# Patient Record
Sex: FEMALE | Race: ASIAN | NOT HISPANIC OR LATINO | Employment: FULL TIME | URBAN - METROPOLITAN AREA
[De-identification: names, ages, dates, MRNs, and addresses within clinical notes are randomized per-mention and may not be internally consistent; named-entity substitution may affect disease eponyms.]

---

## 2017-01-17 DIAGNOSIS — R92.1 MAMMOGRAPHIC CALCIFICATION FOUND ON DIAGNOSTIC IMAGING OF BREAST: ICD-10-CM

## 2017-01-30 ENCOUNTER — TRANSCRIBE ORDERS (OUTPATIENT)
Dept: MAMMOGRAPHY | Facility: CLINIC | Age: 47
End: 2017-01-30

## 2017-01-30 ENCOUNTER — HOSPITAL ENCOUNTER (OUTPATIENT)
Dept: ULTRASOUND IMAGING | Facility: CLINIC | Age: 47
Discharge: HOME | End: 2017-01-30
Payer: COMMERCIAL

## 2017-01-30 DIAGNOSIS — N63.0 LUMP OR MASS IN BREAST: Primary | ICD-10-CM

## 2017-01-30 DIAGNOSIS — R92.1 MAMMOGRAPHIC CALCIFICATION FOUND ON DIAGNOSTIC IMAGING OF BREAST: ICD-10-CM

## 2017-01-30 PROCEDURE — 76642 ULTRASOUND BREAST LIMITED: CPT

## 2017-06-21 DIAGNOSIS — R92.8 OTHER ABNORMAL AND INCONCLUSIVE FINDINGS ON DIAGNOSTIC IMAGING OF BREAST: ICD-10-CM

## 2017-06-21 DIAGNOSIS — R92.1 MAMMOGRAPHIC CALCIFICATION FOUND ON DIAGNOSTIC IMAGING OF BREAST: ICD-10-CM

## 2017-06-21 DIAGNOSIS — Z12.31 ENCOUNTER FOR SCREENING MAMMOGRAM FOR MALIGNANT NEOPLASM OF BREAST: ICD-10-CM

## 2017-06-23 ENCOUNTER — GENERIC CONVERSION - ENCOUNTER (OUTPATIENT)
Dept: OTHER | Facility: OTHER | Age: 47
End: 2017-06-23

## 2017-06-23 ENCOUNTER — ALLSCRIPTS OFFICE VISIT (OUTPATIENT)
Dept: OTHER | Facility: OTHER | Age: 47
End: 2017-06-23

## 2017-06-27 ENCOUNTER — GENERIC CONVERSION - ENCOUNTER (OUTPATIENT)
Dept: OTHER | Facility: OTHER | Age: 47
End: 2017-06-27

## 2017-06-27 LAB
ADEQUACY: (HISTORICAL): NORMAL
CLINICIAN PROVIDIED ICD 9 OR 10 (HISTORICAL): NORMAL
COMMENT (HISTORICAL): NORMAL
DIAGNOSIS (HISTORICAL): NORMAL
NOTE: (HISTORICAL): NORMAL
PERFORMED BY (HISTORICAL): NORMAL
REFLEX (HISTORICAL): NORMAL
TEST INFORMATION (HISTORICAL): NORMAL

## 2017-10-12 DIAGNOSIS — R92.8 OTHER ABNORMAL AND INCONCLUSIVE FINDINGS ON DIAGNOSTIC IMAGING OF BREAST: ICD-10-CM

## 2017-10-12 DIAGNOSIS — R92.1 MAMMOGRAPHIC CALCIFICATION FOUND ON DIAGNOSTIC IMAGING OF BREAST: ICD-10-CM

## 2017-10-12 DIAGNOSIS — R92.2 INCONCLUSIVE MAMMOGRAM: ICD-10-CM

## 2017-11-03 ENCOUNTER — APPOINTMENT (OUTPATIENT)
Dept: MAMMOGRAPHY | Facility: CLINIC | Age: 47
End: 2017-11-03
Payer: COMMERCIAL

## 2017-11-03 ENCOUNTER — HOSPITAL ENCOUNTER (OUTPATIENT)
Dept: MAMMOGRAPHY | Facility: CLINIC | Age: 47
Discharge: HOME | End: 2017-11-03
Payer: COMMERCIAL

## 2017-11-03 ENCOUNTER — HOSPITAL ENCOUNTER (OUTPATIENT)
Dept: ULTRASOUND IMAGING | Facility: CLINIC | Age: 47
Discharge: HOME | End: 2017-11-03
Payer: COMMERCIAL

## 2017-11-03 DIAGNOSIS — N63.0 LUMP OR MASS IN BREAST: ICD-10-CM

## 2017-11-03 PROCEDURE — 76642 ULTRASOUND BREAST LIMITED: CPT

## 2017-11-03 PROCEDURE — G0279 TOMOSYNTHESIS, MAMMO: HCPCS

## 2017-11-03 PROCEDURE — G0204 DX MAMMO INCL CAD BI: HCPCS

## 2018-01-14 VITALS
BODY MASS INDEX: 21.38 KG/M2 | HEIGHT: 64 IN | WEIGHT: 125.25 LBS | DIASTOLIC BLOOD PRESSURE: 74 MMHG | SYSTOLIC BLOOD PRESSURE: 104 MMHG

## 2018-07-03 ENCOUNTER — ANNUAL EXAM (OUTPATIENT)
Dept: GYNECOLOGY | Facility: CLINIC | Age: 48
End: 2018-07-03
Payer: COMMERCIAL

## 2018-07-03 VITALS
SYSTOLIC BLOOD PRESSURE: 124 MMHG | DIASTOLIC BLOOD PRESSURE: 84 MMHG | BODY MASS INDEX: 21.72 KG/M2 | HEIGHT: 64 IN | WEIGHT: 127.2 LBS

## 2018-07-03 DIAGNOSIS — Z01.419 ENCOUNTER FOR GYNECOLOGICAL EXAMINATION WITHOUT ABNORMAL FINDING: ICD-10-CM

## 2018-07-03 DIAGNOSIS — Z01.419 ENCOUNTER FOR GYNECOLOGICAL EXAMINATION WITH PAPANICOLAOU SMEAR OF CERVIX: ICD-10-CM

## 2018-07-03 DIAGNOSIS — L30.9 ECZEMA, UNSPECIFIED TYPE: Primary | ICD-10-CM

## 2018-07-03 PROCEDURE — S0612 ANNUAL GYNECOLOGICAL EXAMINA: HCPCS | Performed by: OBSTETRICS & GYNECOLOGY

## 2018-07-03 PROCEDURE — G0145 SCR C/V CYTO,THINLAYER,RESCR: HCPCS | Performed by: OBSTETRICS & GYNECOLOGY

## 2018-07-03 RX ORDER — LORATADINE 10 MG/1
TABLET ORAL
COMMUNITY

## 2018-07-03 RX ORDER — CLOBETASOL PROPIONATE 0.5 MG/G
CREAM TOPICAL 2 TIMES DAILY
Qty: 30 G | Refills: 0
Start: 2018-07-03 | End: 2018-07-18 | Stop reason: SDUPTHER

## 2018-07-03 NOTE — PROGRESS NOTES
Assessment/Plan:         Diagnoses and all orders for this visit:    Encounter for gynecological examination without abnormal finding    Eczema, unspecified type--clobetasol    Other orders  -     loratadine (CLARITIN) 10 mg tablet; Take by mouth        Subjective:      Patient ID: Margi Aguilar is a 51 y o  female  HPI  Annual exam  Menses regular albeit scant since ablation  C/O slight pruritic rash above Lt breast    The following portions of the patient's history were reviewed and updated as appropriate: allergies, current medications, past family history, past medical history, past social history, past surgical history and problem list     Review of Systems   Constitutional: Negative  Gastrointestinal: Negative  Genitourinary: Negative  Skin: Positive for rash  Objective:      /84 (BP Location: Right arm)   Ht 5' 3 5" (1 613 m)   Wt 57 7 kg (127 lb 3 2 oz)   LMP 06/26/2018   BMI 22 18 kg/m²          Physical Exam   Constitutional: She appears well-developed and well-nourished  Neck: Normal range of motion  Neck supple  No thyromegaly present  Cardiovascular: Normal rate, regular rhythm and normal heart sounds  Pulmonary/Chest: Effort normal and breath sounds normal  Right breast exhibits no inverted nipple, no mass, no nipple discharge, no skin change and no tenderness  Left breast exhibits no inverted nipple, no mass, no nipple discharge and no tenderness  Scaly, reddish rash above Lt breast c/w eczema   Abdominal: Hernia confirmed negative in the right inguinal area and confirmed negative in the left inguinal area  Genitourinary: There is no rash or lesion on the right labia  There is no rash or lesion on the left labia  Uterus is not deviated, not enlarged, not fixed and not tender  Cervix exhibits no motion tenderness, no discharge and no friability  Right adnexum displays no mass, no tenderness and no fullness   Left adnexum displays no mass, no tenderness and no fullness  No erythema or bleeding in the vagina  No vaginal discharge found  Lymphadenopathy:        Right: No inguinal adenopathy present  Left: No inguinal adenopathy present  Skin: Rash noted

## 2018-07-09 LAB
LAB AP GYN PRIMARY INTERPRETATION: NORMAL
LAB AP LMP: NORMAL
Lab: NORMAL

## 2018-07-18 DIAGNOSIS — L30.9 ECZEMA, UNSPECIFIED TYPE: ICD-10-CM

## 2018-07-18 RX ORDER — CLOBETASOL PROPIONATE 0.5 MG/G
CREAM TOPICAL 2 TIMES DAILY
Qty: 30 G | Refills: 0 | Status: SHIPPED | OUTPATIENT
Start: 2018-07-18

## 2018-10-30 DIAGNOSIS — Z12.39 SCREENING FOR BREAST CANCER: Primary | ICD-10-CM

## 2018-11-05 ENCOUNTER — HOSPITAL ENCOUNTER (OUTPATIENT)
Dept: MAMMOGRAPHY | Facility: CLINIC | Age: 48
Discharge: HOME | End: 2018-11-05
Payer: COMMERCIAL

## 2018-11-05 DIAGNOSIS — Z12.39 SCREENING FOR BREAST CANCER: ICD-10-CM

## 2018-11-05 PROCEDURE — 77063 BREAST TOMOSYNTHESIS BI: CPT

## 2018-11-05 PROCEDURE — 77067 SCR MAMMO BI INCL CAD: CPT

## 2019-07-09 ENCOUNTER — ANNUAL EXAM (OUTPATIENT)
Dept: GYNECOLOGY | Facility: CLINIC | Age: 49
End: 2019-07-09
Payer: COMMERCIAL

## 2019-07-09 VITALS
DIASTOLIC BLOOD PRESSURE: 70 MMHG | BODY MASS INDEX: 23.25 KG/M2 | HEART RATE: 76 BPM | HEIGHT: 63 IN | WEIGHT: 131.2 LBS | SYSTOLIC BLOOD PRESSURE: 118 MMHG

## 2019-07-09 DIAGNOSIS — Z01.419 ENCOUNTER FOR GYNECOLOGICAL EXAMINATION WITH PAPANICOLAOU SMEAR OF CERVIX: ICD-10-CM

## 2019-07-09 DIAGNOSIS — Z12.31 ENCOUNTER FOR SCREENING MAMMOGRAM FOR MALIGNANT NEOPLASM OF BREAST: Primary | ICD-10-CM

## 2019-07-09 PROCEDURE — S0612 ANNUAL GYNECOLOGICAL EXAMINA: HCPCS | Performed by: OBSTETRICS & GYNECOLOGY

## 2019-07-09 PROCEDURE — G0145 SCR C/V CYTO,THINLAYER,RESCR: HCPCS | Performed by: OBSTETRICS & GYNECOLOGY

## 2019-07-09 NOTE — PROGRESS NOTES
Assessment/Plan:    No problem-specific Assessment & Plan notes found for this encounter  Diagnoses and all orders for this visit:    Encounter for screening mammogram for malignant neoplasm of breast  -     Mammo screening bilateral w 3d & cad; Future    Encounter for gynecological examination with Papanicolaou smear of cervix  -     Liquid-based pap, screening      Colonoscopy recommended  Referred to Dr Francie child    Subjective:     Patient ID: Roxy Hillman is a 47 y o  female  HPI  Annual exam  Menses regular albeit scant since ablation  No c/o    The following portions of the patient's history were reviewed and updated as appropriate:   She  has no past medical history on file  She There are no active problems to display for this patient  She  has a past surgical history that includes  section; Endometrial biopsy; Hysteroscopy w/ endometrial ablation; Hysteroscopy w/ polypectomy; Breast biopsy; and Tubal ligation  Her family history includes Aortic aneurysm in her father; Hyperlipidemia in her mother; Hypertension in her mother  She  reports that she has never smoked  She has never used smokeless tobacco  She reports that she does not drink alcohol or use drugs  Current Outpatient Medications   Medication Sig Dispense Refill    clobetasol (TEMOVATE) 0 05 % cream Apply topically 2 (two) times a day 30 g 0    loratadine (CLARITIN) 10 mg tablet Take by mouth       No current facility-administered medications for this visit  Current Outpatient Medications on File Prior to Visit   Medication Sig    clobetasol (TEMOVATE) 0 05 % cream Apply topically 2 (two) times a day    loratadine (CLARITIN) 10 mg tablet Take by mouth     No current facility-administered medications on file prior to visit  She is allergic to pollen extract       Review of Systems   Constitutional: Negative  Gastrointestinal: Negative  Genitourinary: Negative            Objective:      /70 (BP Location: Left arm)   Pulse 76   Ht 5' 3" (1 6 m)   Wt 59 5 kg (131 lb 3 2 oz)   LMP 07/03/2019 (Approximate)   BMI 23 24 kg/m²          Physical Exam   Constitutional: She appears well-developed and well-nourished  Neck: Normal range of motion  Neck supple  No thyromegaly present  Cardiovascular: Normal rate, regular rhythm and normal heart sounds  Pulmonary/Chest: Effort normal and breath sounds normal  No respiratory distress  Right breast exhibits no inverted nipple, no mass, no nipple discharge, no skin change and no tenderness  Left breast exhibits no inverted nipple, no mass, no nipple discharge, no skin change and no tenderness  Abdominal: Soft  Bowel sounds are normal  She exhibits no distension and no mass  There is no tenderness  There is no rebound and no guarding  No hernia  Hernia confirmed negative in the right inguinal area and confirmed negative in the left inguinal area  Genitourinary: There is no rash, tenderness or lesion on the right labia  There is no rash, tenderness or lesion on the left labia  Uterus is not deviated, not enlarged, not fixed and not tender  Cervix exhibits no motion tenderness, no discharge and no friability  Right adnexum displays no mass, no tenderness and no fullness  Left adnexum displays no mass, no tenderness and no fullness  No erythema, tenderness or bleeding in the vagina  No vaginal discharge found  Lymphadenopathy:     She has no cervical adenopathy  No inguinal adenopathy noted on the right or left side

## 2019-07-12 LAB
LAB AP GYN PRIMARY INTERPRETATION: NORMAL
Lab: NORMAL

## 2019-12-26 ENCOUNTER — HOSPITAL ENCOUNTER (OUTPATIENT)
Dept: MAMMOGRAPHY | Facility: MEDICAL CENTER | Age: 49
Discharge: HOME | End: 2019-12-26
Payer: COMMERCIAL

## 2019-12-26 VITALS — WEIGHT: 131 LBS | HEIGHT: 63 IN | BODY MASS INDEX: 23.21 KG/M2

## 2019-12-26 DIAGNOSIS — Z12.31 ENCOUNTER FOR SCREENING MAMMOGRAM FOR MALIGNANT NEOPLASM OF BREAST: ICD-10-CM

## 2019-12-26 PROCEDURE — 77067 SCR MAMMO BI INCL CAD: CPT

## 2019-12-26 PROCEDURE — 77063 BREAST TOMOSYNTHESIS BI: CPT

## 2021-04-13 DIAGNOSIS — Z23 ENCOUNTER FOR IMMUNIZATION: ICD-10-CM

## 2021-07-21 ENCOUNTER — HOSPITAL ENCOUNTER (OUTPATIENT)
Dept: MAMMOGRAPHY | Facility: MEDICAL CENTER | Age: 51
Discharge: HOME | End: 2021-07-21
Payer: COMMERCIAL

## 2021-07-21 VITALS — BODY MASS INDEX: 23.21 KG/M2 | WEIGHT: 131 LBS | HEIGHT: 63 IN

## 2021-07-21 DIAGNOSIS — Z12.31 ENCOUNTER FOR SCREENING MAMMOGRAM FOR BREAST CANCER: ICD-10-CM

## 2021-07-21 PROCEDURE — 77067 SCR MAMMO BI INCL CAD: CPT

## 2021-07-21 PROCEDURE — 77063 BREAST TOMOSYNTHESIS BI: CPT

## 2021-08-06 ENCOUNTER — ANNUAL EXAM (OUTPATIENT)
Dept: GYNECOLOGY | Facility: CLINIC | Age: 51
End: 2021-08-06
Payer: COMMERCIAL

## 2021-08-06 VITALS
HEIGHT: 64 IN | BODY MASS INDEX: 21.85 KG/M2 | HEART RATE: 67 BPM | WEIGHT: 128 LBS | DIASTOLIC BLOOD PRESSURE: 60 MMHG | SYSTOLIC BLOOD PRESSURE: 100 MMHG

## 2021-08-06 DIAGNOSIS — Z01.419 ENCOUNTER FOR GYNECOLOGICAL EXAMINATION WITHOUT ABNORMAL FINDING: Primary | ICD-10-CM

## 2021-08-06 DIAGNOSIS — Z01.419 ENCOUNTER FOR GYNECOLOGICAL EXAMINATION WITH PAPANICOLAOU SMEAR OF CERVIX: ICD-10-CM

## 2021-08-06 PROCEDURE — G0145 SCR C/V CYTO,THINLAYER,RESCR: HCPCS | Performed by: OBSTETRICS & GYNECOLOGY

## 2021-08-06 PROCEDURE — S0612 ANNUAL GYNECOLOGICAL EXAMINA: HCPCS | Performed by: OBSTETRICS & GYNECOLOGY

## 2021-08-06 NOTE — PROGRESS NOTES
Assessment/Plan:         Diagnoses and all orders for this visit:    Encounter for gynecological examination without abnormal finding      Colon cancer screening:  Referred to Dr Patito child  Subjective:      Patient ID: Rich Sainz is a 45 y o  female  HPI  patient presents for annual examination  She continues to have regular albeit scant menses since ablation  She denies any vaginal irritation, burning, discharge  Denies any dysuria, hematuria, urgency or stress urinary incontinence  No GI complaints  The following portions of the patient's history were reviewed and updated as appropriate:   She  has no past medical history on file  She There are no problems to display for this patient  She  has a past surgical history that includes  section; Endometrial biopsy; Hysteroscopy w/ endometrial ablation; Hysteroscopy w/ polypectomy; Tubal ligation; and Breast biopsy  Her family history includes Aortic aneurysm in her father; Esophageal cancer (age of onset: 80) in her paternal grandmother; Hyperlipidemia in her mother; Hypertension in her mother; No Known Problems in her maternal aunt, paternal aunt, sister, and sister  She  reports that she has never smoked  She has never used smokeless tobacco  She reports that she does not drink alcohol and does not use drugs  Current Outpatient Medications   Medication Sig Dispense Refill    clobetasol (TEMOVATE) 0 05 % cream Apply topically 2 (two) times a day 30 g 0    loratadine (CLARITIN) 10 mg tablet Take by mouth       No current facility-administered medications for this visit  Current Outpatient Medications on File Prior to Visit   Medication Sig    clobetasol (TEMOVATE) 0 05 % cream Apply topically 2 (two) times a day    loratadine (CLARITIN) 10 mg tablet Take by mouth     No current facility-administered medications on file prior to visit  She is allergic to pollen extract       Review of Systems   Constitutional: Negative  HENT: Negative for sore throat and trouble swallowing  Gastrointestinal: Negative  Genitourinary: Negative  Objective:      /60 (BP Location: Left arm, Patient Position: Sitting, Cuff Size: Standard)   Pulse 67   Ht 5' 3 5" (1 613 m)   Wt 58 1 kg (128 lb)   BMI 22 32 kg/m²          Physical Exam  Vitals reviewed  Constitutional:       Appearance: Normal appearance  She is normal weight  Cardiovascular:      Rate and Rhythm: Normal rate and regular rhythm  Pulses: Normal pulses  Heart sounds: Normal heart sounds  No murmur heard  Pulmonary:      Effort: Pulmonary effort is normal  No respiratory distress  Breath sounds: Normal breath sounds  Chest:      Breasts:         Right: No swelling, bleeding, inverted nipple, mass, nipple discharge, skin change or tenderness  Left: No swelling, bleeding, inverted nipple, mass, nipple discharge, skin change or tenderness  Abdominal:      General: There is no distension  Palpations: Abdomen is soft  There is no mass  Tenderness: There is no abdominal tenderness  There is no guarding or rebound  Hernia: No hernia is present  There is no hernia in the left inguinal area or right inguinal area  Genitourinary:     General: Normal vulva  Labia:         Right: No rash, tenderness or lesion  Left: No rash, tenderness or lesion  Vagina: Normal       Cervix: Normal       Uterus: Normal        Adnexa:         Right: No mass, tenderness or fullness  Left: No mass, tenderness or fullness  Musculoskeletal:      Cervical back: Normal range of motion and neck supple  No tenderness  Lymphadenopathy:      Cervical: No cervical adenopathy  Upper Body:      Right upper body: No supraclavicular, axillary or pectoral adenopathy  Left upper body: No supraclavicular, axillary or pectoral adenopathy  Lower Body: No right inguinal adenopathy  No left inguinal adenopathy  Neurological:      Mental Status: She is alert

## 2021-08-12 LAB
LAB AP GYN PRIMARY INTERPRETATION: NORMAL
Lab: NORMAL

## 2022-03-18 ENCOUNTER — TELEPHONE (OUTPATIENT)
Dept: GASTROENTEROLOGY | Facility: CLINIC | Age: 52
End: 2022-03-18

## 2022-03-18 NOTE — TELEPHONE ENCOUNTER
03/18/22  Screened by: Shameka Ariza    Referring Provider Donna Arreola    Pre- Screening: There is no height or weight on file to calculate BMI  Has patient been referred for a routine screening Colonoscopy? yes  Is the patient between 39-70 years old? yes      Previous Colonoscopy no   If yes:    Date:     Facility:     Reason:       SCHEDULING STAFF: If the patient is between 45yrs-49yrs, please advise patient to confirm benefits/coverage with their insurance company for a routine screening colonoscopy, some insurance carriers will only cover at [facility] or [facility]  If the patient is over 66years old, please schedule an office visit  Does the patient want to see a Gastroenterologist prior to their procedure OR are they having any GI symptoms? no    Has the patient been hospitalized or had abdominal surgery in the past 6 months? no    Does the patient use supplemental oxygen? no    Does the patient take Coumadin, Lovenox, Plavix, Elliquis, Xarelto, or other blood thinning medication? no    Has the patient had a stroke, cardiac event, or stent placed in the past year? no     PT PASSED OA BEST CONTACT NUMBER [phone]    SCHEDULING STAFF: If patient answers NO to above questions, then schedule procedure  If patient answers YES to above questions, then schedule office appointment  If patient is between 45yrs - 49yrs, please advise patient that we will have to confirm benefits & coverage with their insurance company for a routine screening colonoscopy  [Negative] : Endocrine [Recent Change In Weight] : ~T no recent weight change [Dysphagia] : no dysphagia [Chest Pain] : no chest pain [Palpitations] : no palpitations [Shortness Of Breath] : no shortness of breath [Abdominal Pain] : no abdominal pain [Wheezing] : no wheezing [Vomiting] : no vomiting [Constipation] : no constipation [Diarrhea] : no diarrhea [Reflux/Heartburn] : no reflex/heartburn [FreeTextEntry2] : weight stable

## 2022-04-05 NOTE — TELEPHONE ENCOUNTER
Scheduled date of colonoscopy (as of today):06 14 22  Physician performing colonoscopy:DR VIDALES  Location of colonoscopy:[place]  Bowel prep reviewed with patient:DULCOLAX/MIRALAX  Instructions reviewed with patient by:RUTHY VERBALLY/AMILED  Clearances: N/A

## 2022-06-10 RX ORDER — SODIUM CHLORIDE 9 MG/ML
125 INJECTION, SOLUTION INTRAVENOUS CONTINUOUS
Status: CANCELLED | OUTPATIENT
Start: 2022-06-10

## 2022-06-13 ENCOUNTER — ANESTHESIA EVENT (OUTPATIENT)
Dept: ANESTHESIOLOGY | Facility: HOSPITAL | Age: 52
End: 2022-06-13

## 2022-06-13 ENCOUNTER — ANESTHESIA (OUTPATIENT)
Dept: ANESTHESIOLOGY | Facility: HOSPITAL | Age: 52
End: 2022-06-13

## 2022-06-13 NOTE — ANESTHESIA PREPROCEDURE EVALUATION
Procedure:  PRE-OP ONLY    Relevant Problems   ANESTHESIA (within normal limits)      CARDIO (within normal limits)      ENDO (within normal limits)      GI/HEPATIC (within normal limits)      /RENAL (within normal limits)      GYN (within normal limits)      HEMATOLOGY (within normal limits)      MUSCULOSKELETAL (within normal limits)      NEURO/PSYCH (within normal limits)      PULMONARY (within normal limits)             Anesthesia Plan  ASA Score- 1     Anesthesia Type- IV sedation with anesthesia with ASA Monitors  Additional Monitors:   Airway Plan:           Plan Factors-Exercise tolerance (METS): >4 METS  Chart reviewed  Patient summary reviewed  Patient is not a current smoker  Induction- intravenous  Postoperative Plan-     Informed Consent- Anesthetic plan and risks discussed with patient

## 2022-06-14 ENCOUNTER — ANESTHESIA EVENT (OUTPATIENT)
Dept: GASTROENTEROLOGY | Facility: MEDICAL CENTER | Age: 52
End: 2022-06-14

## 2022-06-14 ENCOUNTER — HOSPITAL ENCOUNTER (OUTPATIENT)
Dept: GASTROENTEROLOGY | Facility: MEDICAL CENTER | Age: 52
Setting detail: OUTPATIENT SURGERY
Discharge: HOME | End: 2022-06-14
Attending: INTERNAL MEDICINE | Admitting: INTERNAL MEDICINE
Payer: COMMERCIAL

## 2022-06-14 ENCOUNTER — ANESTHESIA (OUTPATIENT)
Dept: GASTROENTEROLOGY | Facility: MEDICAL CENTER | Age: 52
End: 2022-06-14

## 2022-06-14 VITALS
BODY MASS INDEX: 21.85 KG/M2 | WEIGHT: 128 LBS | RESPIRATION RATE: 14 BRPM | HEART RATE: 82 BPM | TEMPERATURE: 97.7 F | HEIGHT: 64 IN | OXYGEN SATURATION: 100 % | SYSTOLIC BLOOD PRESSURE: 137 MMHG | DIASTOLIC BLOOD PRESSURE: 87 MMHG

## 2022-06-14 DIAGNOSIS — Z12.11 SCREEN FOR COLON CANCER: ICD-10-CM

## 2022-06-14 PROCEDURE — 88305 TISSUE EXAM BY PATHOLOGIST: CPT | Performed by: PATHOLOGY

## 2022-06-14 PROCEDURE — 45385 COLONOSCOPY W/LESION REMOVAL: CPT | Performed by: INTERNAL MEDICINE

## 2022-06-14 PROCEDURE — 45380 COLONOSCOPY AND BIOPSY: CPT | Performed by: INTERNAL MEDICINE

## 2022-06-14 RX ORDER — SODIUM CHLORIDE 9 MG/ML
125 INJECTION, SOLUTION INTRAVENOUS CONTINUOUS
Status: DISCONTINUED | OUTPATIENT
Start: 2022-06-14 | End: 2022-06-18 | Stop reason: HOSPADM

## 2022-06-14 RX ORDER — LIDOCAINE HYDROCHLORIDE 20 MG/ML
INJECTION, SOLUTION EPIDURAL; INFILTRATION; INTRACAUDAL; PERINEURAL AS NEEDED
Status: DISCONTINUED | OUTPATIENT
Start: 2022-06-14 | End: 2022-06-14

## 2022-06-14 RX ORDER — PROPOFOL 10 MG/ML
INJECTION, EMULSION INTRAVENOUS AS NEEDED
Status: DISCONTINUED | OUTPATIENT
Start: 2022-06-14 | End: 2022-06-14

## 2022-06-14 RX ADMIN — Medication 40 MG: at 13:54

## 2022-06-14 RX ADMIN — PROPOFOL 50 MG: 10 INJECTION, EMULSION INTRAVENOUS at 13:51

## 2022-06-14 RX ADMIN — PROPOFOL 50 MG: 10 INJECTION, EMULSION INTRAVENOUS at 13:56

## 2022-06-14 RX ADMIN — SODIUM CHLORIDE 125 ML/HR: 0.9 INJECTION, SOLUTION INTRAVENOUS at 13:06

## 2022-06-14 RX ADMIN — PROPOFOL 50 MG: 10 INJECTION, EMULSION INTRAVENOUS at 13:44

## 2022-06-14 RX ADMIN — LIDOCAINE HYDROCHLORIDE 80 MG: 20 INJECTION, SOLUTION EPIDURAL; INFILTRATION; INTRACAUDAL; PERINEURAL at 13:39

## 2022-06-14 RX ADMIN — PROPOFOL 100 MG: 10 INJECTION, EMULSION INTRAVENOUS at 13:39

## 2022-06-14 NOTE — H&P
History and Physical -  [facility]  Rafiq Alvarenga 47 y o  female MRN: [id number]                  HPI: Rafiq Alvarenga is a 47y o  year old female who presents for colon cancer screening  REVIEW OF SYSTEMS: Per the HPI, and otherwise unremarkable  Historical Information   History reviewed  No pertinent past medical history  Past Surgical History:   Procedure Laterality Date    BREAST BIOPSY      age 35     SECTION      ENDOMETRIAL BIOPSY      HYSTEROSCOPY W/ ENDOMETRIAL ABLATION      HYSTEROSCOPY W/ POLYPECTOMY      TUBAL LIGATION       Social History   Social History     Substance and Sexual Activity   Alcohol Use No     Social History     Substance and Sexual Activity   Drug Use No     Social History     Tobacco Use   Smoking Status Never Smoker   Smokeless Tobacco Never Used     Family History   Problem Relation Age of Onset    Hypertension Mother     Hyperlipidemia Mother     Aortic aneurysm Father     No Known Problems Sister     No Known Problems Sister     Esophageal cancer Paternal Grandmother 80    No Known Problems Maternal Aunt     No Known Problems Paternal Aunt        Meds/Allergies     (Not in a hospital admission)      Allergies   Allergen Reactions    Pollen Extract Allergic Rhinitis       Objective     Blood pressure 139/77, pulse 71, temperature 97 7 °F (36 5 °C), temperature source Temporal, resp  rate 18, height 5' 3 5" (1 613 m), weight 58 1 kg (128 lb), SpO2 96 %  PHYSICAL EXAM    Gen: NAD  CV: RRR  CHEST: Clear  ABD: soft, NT/ND  EXT: no edema      ASSESSMENT/PLAN:  Rafiq Alvarenga is a 47y o  year old female who presents for colon cancer screening  The patient is stable and optimized for the procedure, we reviewed risk and benefits  Risk include but not limited to infection, bleeding, perforation and missing a lesion

## 2022-06-14 NOTE — ANESTHESIA POSTPROCEDURE EVALUATION
Post-Op Assessment Note    CV Status:  Stable    Pain management: adequate     Mental Status:  Alert and awake   Hydration Status:  Euvolemic   PONV Controlled:  Controlled   Airway Patency:  Patent      Post Op Vitals Reviewed: Yes      Staff: Anesthesiologist         No complications documented      BP      Temp      Pulse     Resp      SpO2      /87   Pulse 82   Temp 97 7 °F (36 5 °C) (Temporal)   Resp 14   Ht 5' 3 5" (1 613 m)   Wt 58 1 kg (128 lb)   SpO2 100%   BMI 22 32 kg/m²

## 2022-06-14 NOTE — ANESTHESIA PREPROCEDURE EVALUATION
Procedure:  COLONOSCOPY    Relevant Problems   ANESTHESIA (within normal limits)      CARDIO (within normal limits)      ENDO (within normal limits)      GI/HEPATIC (within normal limits)      /RENAL (within normal limits)      GYN (within normal limits)      HEMATOLOGY (within normal limits)      MUSCULOSKELETAL (within normal limits)      NEURO/PSYCH (within normal limits)      PULMONARY (within normal limits)        Physical Exam    Airway    Mallampati score: I  TM Distance: >3 FB  Neck ROM: full     Dental   No notable dental hx     Cardiovascular  Rhythm: regular, Rate: normal, Cardiovascular exam normal    Pulmonary  Pulmonary exam normal Breath sounds clear to auscultation,     Other Findings        Anesthesia Plan  ASA Score- 1     Anesthesia Type- IV sedation with anesthesia with ASA Monitors  Additional Monitors:   Airway Plan:           Plan Factors-Exercise tolerance (METS): >4 METS  Chart reviewed  Patient summary reviewed  Patient is not a current smoker  Induction- intravenous  Postoperative Plan-     Informed Consent- Anesthetic plan and risks discussed with patient

## 2022-08-10 ENCOUNTER — ANNUAL EXAM (OUTPATIENT)
Dept: GYNECOLOGY | Facility: CLINIC | Age: 52
End: 2022-08-10
Payer: COMMERCIAL

## 2022-08-10 VITALS
HEART RATE: 78 BPM | DIASTOLIC BLOOD PRESSURE: 74 MMHG | HEIGHT: 64 IN | BODY MASS INDEX: 21.48 KG/M2 | SYSTOLIC BLOOD PRESSURE: 124 MMHG | WEIGHT: 125.8 LBS

## 2022-08-10 DIAGNOSIS — Z13.820 SCREENING FOR OSTEOPOROSIS: ICD-10-CM

## 2022-08-10 DIAGNOSIS — Z01.419 ENCOUNTER FOR GYNECOLOGICAL EXAMINATION WITH PAPANICOLAOU SMEAR OF CERVIX: ICD-10-CM

## 2022-08-10 DIAGNOSIS — Z01.419 ENCOUNTER FOR GYNECOLOGICAL EXAMINATION WITHOUT ABNORMAL FINDING: Primary | ICD-10-CM

## 2022-08-10 PROCEDURE — G0145 SCR C/V CYTO,THINLAYER,RESCR: HCPCS | Performed by: OBSTETRICS & GYNECOLOGY

## 2022-08-10 PROCEDURE — 76977 US BONE DENSITY MEASURE: CPT | Performed by: OBSTETRICS & GYNECOLOGY

## 2022-08-10 PROCEDURE — S0612 ANNUAL GYNECOLOGICAL EXAMINA: HCPCS | Performed by: OBSTETRICS & GYNECOLOGY

## 2022-08-10 NOTE — PROGRESS NOTES
Assessment/Plan:       Diagnoses and all orders for this visit:    Encounter for gynecological examination without abnormal finding    Screening for osteoporosis; heel scan: +0 3      Abdominal pain/constipation  Patient will try Benefiber around her menses  If no relief she will contact her gastroenterologist    Subjective:      Patient ID: Barrera Armstrong is a 47 y o  female  HPI  patient presents for annual examination  She continues to have regular menses albeit scant since ablation  She has been noticing increased left lower quadrant abdominal pain during her menses  This is associated with constipation  She denies any nausea, vomiting diarrhea fever  Denies any dysuria, hematuria urgency change in frequency of urination or urinary incontinence  Colonoscopy 2022  Polyps identified  Advised to repeat in 3 years  The following portions of the patient's history were reviewed and updated as appropriate:   She  has no past medical history on file  She There are no problems to display for this patient  She  has a past surgical history that includes  section; Endometrial biopsy; Hysteroscopy w/ endometrial ablation; Hysteroscopy w/ polypectomy; Tubal ligation; Breast biopsy; and Colonoscopy (2022)  Her family history includes Aortic aneurysm in her father; Esophageal cancer (age of onset: 80) in her paternal grandmother; Hyperlipidemia in her mother; Hypertension in her mother; No Known Problems in her maternal aunt, paternal aunt, sister, and sister  She  reports that she has never smoked  She has never used smokeless tobacco  She reports that she does not drink alcohol and does not use drugs  Current Outpatient Medications   Medication Sig Dispense Refill    clobetasol (TEMOVATE) 0 05 % cream Apply topically 2 (two) times a day 30 g 0    loratadine (CLARITIN) 10 mg tablet Take by mouth       No current facility-administered medications for this visit       Current Outpatient Medications on File Prior to Visit   Medication Sig    clobetasol (TEMOVATE) 0 05 % cream Apply topically 2 (two) times a day    loratadine (CLARITIN) 10 mg tablet Take by mouth     No current facility-administered medications on file prior to visit  She is allergic to pollen extract       Review of Systems   Constitutional: Negative  HENT: Negative for sore throat and trouble swallowing  Gastrointestinal: Positive for abdominal pain and constipation  Negative for abdominal distention, blood in stool, diarrhea, nausea and vomiting  Genitourinary: Negative  Objective:      /74   Pulse 78   Ht 5' 3 5" (1 613 m)   Wt 57 1 kg (125 lb 12 8 oz)   LMP 08/09/2022   BMI 21 93 kg/m²          Physical Exam  Vitals reviewed  Constitutional:       Appearance: Normal appearance  She is normal weight  Cardiovascular:      Rate and Rhythm: Normal rate and regular rhythm  Pulses: Normal pulses  Heart sounds: Normal heart sounds  No murmur heard  Pulmonary:      Effort: Pulmonary effort is normal  No respiratory distress  Breath sounds: Normal breath sounds  Chest:   Breasts:      Right: No swelling, bleeding, inverted nipple, mass, nipple discharge, skin change, tenderness, axillary adenopathy or supraclavicular adenopathy  Left: No swelling, bleeding, inverted nipple, mass, nipple discharge, skin change, tenderness, axillary adenopathy or supraclavicular adenopathy  Abdominal:      General: There is no distension  Palpations: Abdomen is soft  There is no mass  Tenderness: There is no abdominal tenderness  There is no guarding or rebound  Hernia: No hernia is present  There is no hernia in the left inguinal area or right inguinal area  Genitourinary:     General: Normal vulva  Labia:         Right: No rash, tenderness or lesion  Left: No rash, tenderness or lesion         Vagina: Normal       Cervix: Normal       Uterus: Normal  Adnexa:         Right: No mass, tenderness or fullness  Left: No mass, tenderness or fullness  Musculoskeletal:      Cervical back: Normal range of motion and neck supple  No tenderness  Lymphadenopathy:      Cervical: No cervical adenopathy  Upper Body:      Right upper body: No supraclavicular, axillary or pectoral adenopathy  Left upper body: No supraclavicular, axillary or pectoral adenopathy  Lower Body: No right inguinal adenopathy  No left inguinal adenopathy  Neurological:      Mental Status: She is alert

## 2022-08-16 LAB
LAB AP GYN PRIMARY INTERPRETATION: NORMAL
Lab: NORMAL

## 2022-11-03 ENCOUNTER — TELEPHONE (OUTPATIENT)
Dept: GYNECOLOGY | Facility: CLINIC | Age: 52
End: 2022-11-03

## 2022-11-03 DIAGNOSIS — Z12.31 ENCOUNTER FOR SCREENING MAMMOGRAM FOR MALIGNANT NEOPLASM OF BREAST: Primary | ICD-10-CM

## 2023-01-11 ENCOUNTER — HOSPITAL ENCOUNTER (OUTPATIENT)
Dept: MAMMOGRAPHY | Facility: MEDICAL CENTER | Age: 53
Discharge: HOME | End: 2023-01-11

## 2023-01-11 VITALS — WEIGHT: 125.88 LBS | BODY MASS INDEX: 21.49 KG/M2 | HEIGHT: 64 IN

## 2023-01-11 DIAGNOSIS — Z12.31 ENCOUNTER FOR SCREENING MAMMOGRAM FOR MALIGNANT NEOPLASM OF BREAST: ICD-10-CM

## 2023-01-19 ENCOUNTER — HOSPITAL ENCOUNTER (OUTPATIENT)
Dept: ULTRASOUND IMAGING | Facility: CLINIC | Age: 53
Discharge: HOME | End: 2023-01-19

## 2023-01-19 VITALS — WEIGHT: 125 LBS | HEIGHT: 63 IN | BODY MASS INDEX: 22.15 KG/M2

## 2023-01-19 DIAGNOSIS — R92.8 ABNORMAL SCREENING MAMMOGRAM: ICD-10-CM

## 2023-08-11 ENCOUNTER — ANNUAL EXAM (OUTPATIENT)
Dept: GYNECOLOGY | Facility: CLINIC | Age: 53
End: 2023-08-11
Payer: COMMERCIAL

## 2023-08-11 VITALS
DIASTOLIC BLOOD PRESSURE: 90 MMHG | WEIGHT: 129 LBS | HEIGHT: 63 IN | SYSTOLIC BLOOD PRESSURE: 140 MMHG | BODY MASS INDEX: 22.86 KG/M2 | HEART RATE: 72 BPM

## 2023-08-11 DIAGNOSIS — Z01.419 ENCOUNTER FOR GYNECOLOGICAL EXAMINATION WITHOUT ABNORMAL FINDING: Primary | ICD-10-CM

## 2023-08-11 DIAGNOSIS — Z12.31 ENCOUNTER FOR SCREENING MAMMOGRAM FOR MALIGNANT NEOPLASM OF BREAST: ICD-10-CM

## 2023-08-11 PROCEDURE — G0145 SCR C/V CYTO,THINLAYER,RESCR: HCPCS | Performed by: OBSTETRICS & GYNECOLOGY

## 2023-08-11 PROCEDURE — S0612 ANNUAL GYNECOLOGICAL EXAMINA: HCPCS | Performed by: OBSTETRICS & GYNECOLOGY

## 2023-08-11 NOTE — PROGRESS NOTES
Assessment/Plan:         Diagnoses and all orders for this visit:    Encounter for gynecological examination without abnormal finding  -     Liquid-based pap, screening    Encounter for screening mammogram for malignant neoplasm of breast  -     Mammo screening bilateral w 3d & cad; Future        Subjective:      Patient ID: Joaquim Watters is a 46 y.o. female. HPI patient presents for annual examination. She offers no complaints. Her menses have been regular up until May. She has had no menses since May. She was hospitalized at [facility] for perirectal abscess in March of this year. She denies any vaginal irritation, burning, discharge or bleeding. Denies any dysuria, hematuria or urgency. Colonoscopy 2022. Polyps identified. Advised to repeat in 3 years. Osteoporosis screening via peripheral scan 2022. 0.3    The following portions of the patient's history were reviewed and updated as appropriate:   She  has no past medical history on file. She There are no problems to display for this patient. She  has a past surgical history that includes  section; Endometrial biopsy; Hysteroscopy w/ endometrial ablation; Hysteroscopy w/ polypectomy; Tubal ligation; Breast biopsy; and Colonoscopy (2022). Her family history includes Aortic aneurysm in her father; Esophageal cancer (age of onset: 80) in her paternal grandmother; Hyperlipidemia in her mother; Hypertension in her mother; No Known Problems in her maternal aunt, paternal aunt, sister, and sister. She  reports that she has never smoked. She has never used smokeless tobacco. She reports that she does not drink alcohol and does not use drugs.   Current Outpatient Medications   Medication Sig Dispense Refill   • clobetasol (TEMOVATE) 0.05 % cream Apply topically 2 (two) times a day 30 g 0   • loratadine (CLARITIN) 10 mg tablet Take by mouth (Patient not taking: Reported on 2023)       No current facility-administered medications for this visit. Current Outpatient Medications on File Prior to Visit   Medication Sig   • clobetasol (TEMOVATE) 0.05 % cream Apply topically 2 (two) times a day   • loratadine (CLARITIN) 10 mg tablet Take by mouth (Patient not taking: Reported on 8/11/2023)     No current facility-administered medications on file prior to visit. She is allergic to pollen extract. .    Review of Systems   Constitutional: Negative. HENT: Negative for sore throat and trouble swallowing. Gastrointestinal: Negative. Genitourinary: Negative. Objective:      /90   Pulse 72   Ht 5' 2.99" (1.6 m)   Wt 58.5 kg (129 lb)   LMP 05/20/2023   BMI 22.86 kg/m²          Physical Exam  Vitals reviewed. Constitutional:       Appearance: Normal appearance. She is normal weight. Cardiovascular:      Rate and Rhythm: Normal rate and regular rhythm. Pulses: Normal pulses. Heart sounds: Normal heart sounds. No murmur heard. Pulmonary:      Effort: Pulmonary effort is normal. No respiratory distress. Breath sounds: Normal breath sounds. Chest:   Breasts:     Right: No swelling, bleeding, inverted nipple, mass, nipple discharge, skin change or tenderness. Left: No swelling, bleeding, inverted nipple, mass, nipple discharge, skin change or tenderness. Abdominal:      General: There is no distension. Palpations: Abdomen is soft. There is no mass. Tenderness: There is no abdominal tenderness. There is no guarding or rebound. Hernia: No hernia is present. There is no hernia in the left inguinal area or right inguinal area. Genitourinary:     General: Normal vulva. Labia:         Right: No rash, tenderness or lesion. Left: No rash, tenderness or lesion. Vagina: Normal.      Cervix: Normal.      Uterus: Normal.       Adnexa:         Right: No mass, tenderness or fullness. Left: No mass, tenderness or fullness. Musculoskeletal:      Cervical back: Normal range of motion and neck supple. No tenderness. Lymphadenopathy:      Cervical: No cervical adenopathy. Upper Body:      Right upper body: No supraclavicular, axillary or pectoral adenopathy. Left upper body: No supraclavicular, axillary or pectoral adenopathy. Lower Body: No right inguinal adenopathy. No left inguinal adenopathy. Neurological:      Mental Status: She is alert.

## 2023-08-17 LAB
LAB AP GYN PRIMARY INTERPRETATION: NORMAL
Lab: NORMAL

## 2023-09-28 ENCOUNTER — TELEPHONE (OUTPATIENT)
Dept: GYNECOLOGY | Facility: CLINIC | Age: 53
End: 2023-09-28

## 2023-09-28 NOTE — TELEPHONE ENCOUNTER
Pt states she is in perimenopause. She has been without a period for 4 months and has onset of a period today. Pt would like to know what you advise.

## 2023-10-11 ENCOUNTER — ULTRASOUND (OUTPATIENT)
Dept: GYNECOLOGY | Facility: CLINIC | Age: 53
End: 2023-10-11

## 2023-10-11 ENCOUNTER — OFFICE VISIT (OUTPATIENT)
Dept: GYNECOLOGY | Facility: CLINIC | Age: 53
End: 2023-10-11

## 2023-10-11 DIAGNOSIS — N93.9 ABNORMAL UTERINE BLEEDING (AUB): Primary | ICD-10-CM

## 2023-10-11 PROCEDURE — 88305 TISSUE EXAM BY PATHOLOGIST: CPT | Performed by: PATHOLOGY

## 2023-10-11 NOTE — PROGRESS NOTES
Patient presents the office for TVS SIS possible biopsy secondary to abnormal uterine bleeding. She had gone 4 months without any bleeding then had an episode of bleeding described as a moderate flow. She had regular menses up until May. She then had an episode of bleeding approximately 4 to 5 months later. AMB US Pelvic Non OB [[id number]] ordered by Alma Logan [[id number]] ordered by Laureen Barnard   Endometrial biopsy [[id number]] ordered by Laureen Barnard     Post-procedure Diagnoses   Abnormal uterine bleeding (AUB) [N93.9]          Incomplete         AMB US Pelvic Non OB     Date/Time: 10/11/2023 1:00 PM     Performed by: Laureen Barnard  Authorized by: DO Cristina Franklin Protocol:  Consent given by: patient  Patient understanding: patient states understanding of the procedure being performed  Patient identity confirmed: verbally with patient     Procedure details:     Technique:  Transvaginal US, Non-OB    Position: lithotomy exam    Uterine findings:     Length (cm): 9.01    Height (cm):  5.43    Width (cm):  6.62    Endometrial stripe: identified      Endometrium thickness (mm):  11.89  Left ovary findings:     Left ovary:  Visualized    Length (cm): 3.26    Height (cm): 1.28    Width (cm): 1.61  Right ovary findings:     Right ovary:  Visualized    Length (cm): 2.73    Height (cm): 1.61    Width (cm): 1.65  Other findings:     Free pelvic fluid: not identified      Free peritoneal fluid: not identified    Post-Procedure Details:     Impression:  Retroverted uterus is inhomogeneous throughout with multiple areas of decreased echogenicity throughout without distinct fibroids noted. The endometrium is thickened. The bilateral ovaries appear within normal limits. No free fluid. Tolerance:   Tolerated well, no immediate complications    Complications: no complications    Additional Procedure Comments:      GE Voluson P8 transvaginal transducer RIC5-RA with Serial Number [id number] was used during procedure and subsequently cleaned with high level disinfection utilizing the [employer]. Ultrasound performed at:      [facility] for [address]  [address]  [address] [facility], [address] [place] [address]  Phone: [phone]  Fax:  [phone]        Sonohysterogram     Date/Time: 10/11/2023 1:00 PM     Performed by: Jazz Rodriguez DO  Authorized by: Jazz Rodriguez DO  Universal Protocol:  Consent: Verbal consent obtained. Consent given by: patient  Patient identity confirmed: verbally with patient     Pre-procedure:     Prepped with: Betadine    Procedure:     Cervix cleaned and prepped: yes      Uterus sounded: yes      Catheter inserted: yes      Uterine cavity distended with saline: yes    Post-procedure:     Patient observed: yes      Post procedure instructions given to patient: yes      Patient tolerated procedure well with no complications: yes    Comments:      Sonohysterogram demonstrates no submucosal polyps or fibroids  Endometrial biopsy     Date/Time: 10/11/2023 1:00 PM     Performed by: Jazz Rodriguez DO  Authorized by: Jazz Rodriguez DO  Universal Protocol:  Consent given by: patient  Patient identity confirmed: verbally with patient     Indication:     Indications: Other disorder of menstruation and other abnormal bleeding from female genital tract    Procedure:     Procedure: endometrial biopsy with Pipelle      A bivalve speculum was placed in the vagina: yes      Cervix cleaned and prepped: yes      Specimen collected: specimen collected and sent to pathology      Patient tolerated procedure well with no complications: yes                  Impression: Abnormal uterine bleeding.     Plan: Await results of biopsy

## 2023-10-11 NOTE — PROGRESS NOTES
AMB US Pelvic Non OB    Date/Time: 10/11/2023 1:00 PM    Performed by: Erlinda Deleon  Authorized by: Sandi Pham DO  Universal Protocol:  Consent given by: patient  Patient understanding: patient states understanding of the procedure being performed  Patient identity confirmed: verbally with patient    Procedure details:     Technique:  Transvaginal US, Non-OB    Position: lithotomy exam    Uterine findings:     Length (cm): 9.01    Height (cm):  5.43    Width (cm):  6.62    Endometrial stripe: identified      Endometrium thickness (mm):  11.89  Left ovary findings:     Left ovary:  Visualized    Length (cm): 3.26    Height (cm): 1.28    Width (cm): 1.61  Right ovary findings:     Right ovary:  Visualized    Length (cm): 2.73    Height (cm): 1.61    Width (cm): 1.65  Other findings:     Free pelvic fluid: not identified      Free peritoneal fluid: not identified    Post-Procedure Details:     Impression:  Retroverted uterus is inhomogeneous throughout with multiple areas of decreased echogenicity throughout without distinct fibroids noted. The endometrium is thickened. The bilateral ovaries appear within normal limits. No free fluid. Tolerance: Tolerated well, no immediate complications    Complications: no complications    Additional Procedure Comments:      GE Voluson P8 transvaginal transducer RIC5-RA with Serial Number [id number] was used during procedure and subsequently cleaned with high level disinfection utilizing the [employer]. Ultrasound performed at:     [facility] for [address]  Phone: [phone]  Fax:  [phone]      Sonohysterogram    Date/Time: 10/11/2023 1:00 PM    Performed by: Sandi Pham DO  Authorized by: Sandi Pham DO  Universal Protocol:  Consent: Verbal consent obtained.   Consent given by: patient  Patient identity confirmed: verbally with patient    Pre-procedure:     Prepped with: Betadine    Procedure:     Cervix cleaned and prepped: yes      Uterus sounded: yes      Catheter inserted: yes      Uterine cavity distended with saline: yes    Post-procedure:     Patient observed: yes      Post procedure instructions given to patient: yes      Patient tolerated procedure well with no complications: yes    Comments:      Sonohysterogram demonstrates a smooth but thickened endometrium. Endometrial biopsy    Date/Time: 10/11/2023 1:00 PM    Performed by: April Cerrato DO  Authorized by: April Cerrato DO  Universal Protocol:  Consent given by: patient  Patient identity confirmed: verbally with patient    Indication:     Indications:  Other disorder of menstruation and other abnormal bleeding from female genital tract    Procedure:     Procedure: endometrial biopsy with Pipelle      A bivalve speculum was placed in the vagina: yes      Cervix cleaned and prepped: yes      Specimen collected: specimen collected and sent to pathology      Patient tolerated procedure well with no complications: yes

## 2023-10-16 PROCEDURE — 88305 TISSUE EXAM BY PATHOLOGIST: CPT | Performed by: PATHOLOGY

## 2023-10-17 ENCOUNTER — TELEPHONE (OUTPATIENT)
Dept: GYNECOLOGY | Facility: CLINIC | Age: 53
End: 2023-10-17

## 2024-01-18 ENCOUNTER — HOSPITAL ENCOUNTER (OUTPATIENT)
Dept: MAMMOGRAPHY | Facility: MEDICAL CENTER | Age: 54
Discharge: HOME | End: 2024-01-18
Payer: COMMERCIAL

## 2024-01-18 VITALS — BODY MASS INDEX: 23.74 KG/M2 | WEIGHT: 129 LBS | HEIGHT: 62 IN

## 2024-01-18 DIAGNOSIS — Z12.31 ENCOUNTER FOR SCREENING MAMMOGRAM FOR MALIGNANT NEOPLASM OF BREAST: ICD-10-CM

## 2024-01-18 PROCEDURE — 77063 BREAST TOMOSYNTHESIS BI: CPT

## 2024-01-18 PROCEDURE — 77067 SCR MAMMO BI INCL CAD: CPT

## 2024-07-28 ENCOUNTER — NURSE TRIAGE (OUTPATIENT)
Dept: OTHER | Facility: OTHER | Age: 54
End: 2024-07-28

## 2024-07-28 NOTE — TELEPHONE ENCOUNTER
"Regarding: vaginal bleeeding/abdominal pain  ----- Message from Nkechi FIELDS sent at 7/28/2024  4:40 PM EDT -----  Pt stated, \"I have not had my period for 6 months. For the past two days I have had lower abdominal pain similar to the pain I had before I was hospitalized last March. Starting today I have vaginal bleeding. Whenever I eat the pain gets worse.\"    "

## 2024-07-28 NOTE — TELEPHONE ENCOUNTER
"Reason for Disposition   [1] Menstrual cycle < 21 days OR > 35 days AND [2] occurs more than two cycles (2 months) this past year    Answer Assessment - Initial Assessment Questions  1. AMOUNT: \"Describe the bleeding that you are having.\"     - SPOTTING: spotting, or pinkish / brownish mucous discharge; does not fill panti-liner or pad     - MILD:  less than 1 pad / hour; less than patient's usual menstrual bleeding    - MODERATE: 1-2 pads / hour; 1 menstrual cup every 6 hours; small-medium blood clots (e.g., pea, grape, small coin)    - SEVERE: soaking 2 or more pads/hour for 2 or more hours; 1 menstrual cup every 2 hours; bleeding not contained by pads or continuous red blood from vagina; large blood clots (e.g., golf ball, large coin)       Only has been 10 minutes    2. ONSET: \"When did the bleeding begin?\" \"Is it continuing now?\"      Last 10 minutes    3. MENSTRUAL PERIOD: \"When was the last normal menstrual period?\" \"How is this different than your period?\"      7 months ago    4. REGULARITY: \"How regular are your periods?\"      Previously regular, episode last year of 4 months with lack period    5. ABDOMINAL PAIN: \"Do you have any pain?\" \"How bad is the pain?\"  (e.g., Scale 1-10; mild, moderate, or severe)    - MILD (1-3): doesn't interfere with normal activities, abdomen soft and not tender to touch     - MODERATE (4-7): interferes with normal activities or awakens from sleep, tender to touch     - SEVERE (8-10): excruciating pain, doubled over, unable to do any normal activities       \"Dull\"/10    6. PREGNANCY: \"Could you be pregnant?\" \"Are you sexually active?\" \"Did you recently give birth?\"      N/A    7. BREASTFEEDING: \"Are you breastfeeding?\"      N/A    8. HORMONES: \"Are you taking any hormone medications, prescription or OTC?\" (e.g., birth control pills, estrogen)      Denies    9. BLOOD THINNERS: \"Do you take any blood thinners?\" (e.g., Coumadin/warfarin, Pradaxa/dabigatran, aspirin)      " "Denies    10. CAUSE: \"What do you think is causing the bleeding?\" (e.g., recent gyn surgery, recent gyn procedure; known bleeding disorder, cervical cancer, polycystic ovarian disease, fibroids)          Similar stomach pain in past and was hospitalized with an infection    11. HEMODYNAMIC STATUS: \"Are you weak or feeling lightheaded?\" If Yes, ask: \"Can you stand and walk normally?\"         Denies    12. OTHER SYMPTOMS: \"What other symptoms are you having with the bleeding?\" (e.g., passed tissue, vaginal discharge, fever, menstrual-type cramps)        Decrease appetite    Protocols used: Vaginal Bleeding - Abnormal-ADULT-AH      You need to be seen for this ongoing problem  * You should be able to treat this at home.  * Monitor temperature and for sensation of fever/chills  * Monitor bleeding for saturating 1-2 pads/hour  * Evaluate if dose of ibuprofen is effective    If symptoms become more severe please call back    Please follow up with patient Monday.  "

## 2024-07-29 NOTE — TELEPHONE ENCOUNTER
LV for pt that I scheduled her for this Wednesday at 12:30 PM for TVS/SIS with possible biopsy. Please have her put through to the office if she calls back

## 2024-07-29 NOTE — TELEPHONE ENCOUNTER
Patient called again, reviewed she has not had period since December. She states she started with cramping pain lower right pelvic area radiating to low back Thursday night 7/25 and Sunday started bleeding, went to bathroom and saw bright blood in toilet, was heavy yesterday but now is more like regular period. Didn't have much blood though the night but sees some bright red bleeding on pad, no clots now. Not changing pads frequently. Reports Pelvic pain level to day is manageable 5/10, Ibuprofen helps. Patient reports about 30 minutes after eating feels bloated and the cramping starts on right side, feels it may be in her colon but she is not sure.Eating small frequent meals. Feeling a lot better today. She does not feel dehydrated, taking electrolyte replacement and able to tolerate small meals today. No bowel or bladder symptoms, no fever.   Patient has upcoming appointment 8/14/24. She feels well enough that she does not needs to be seen before this and prefers  to wait until this appt to be seen. Would like a message via [employer] from provider if she should be evaluated sooner.  Advised Patient to monitor bleeding and call back if any; fever, soaking more than 1-2 pads per hour, passing large clots, severe cramping, increased pain or any other concerns. Patient verbalized understanding and no questions, thankful for f/u.

## 2024-07-31 ENCOUNTER — OFFICE VISIT (OUTPATIENT)
Dept: GYNECOLOGY | Facility: CLINIC | Age: 54
End: 2024-07-31
Payer: COMMERCIAL

## 2024-07-31 ENCOUNTER — ULTRASOUND (OUTPATIENT)
Dept: GYNECOLOGY | Facility: CLINIC | Age: 54
End: 2024-07-31
Payer: COMMERCIAL

## 2024-07-31 DIAGNOSIS — N95.0 PMB (POSTMENOPAUSAL BLEEDING): Primary | ICD-10-CM

## 2024-07-31 DIAGNOSIS — R93.89 ENDOMETRIAL THICKENING ON ULTRASOUND: ICD-10-CM

## 2024-07-31 DIAGNOSIS — Z01.818 PREOP TESTING: Primary | ICD-10-CM

## 2024-07-31 PROCEDURE — 99215 OFFICE O/P EST HI 40 MIN: CPT | Performed by: OBSTETRICS & GYNECOLOGY

## 2024-07-31 PROCEDURE — 76830 TRANSVAGINAL US NON-OB: CPT | Performed by: OBSTETRICS & GYNECOLOGY

## 2024-07-31 PROCEDURE — 58100 BIOPSY OF UTERUS LINING: CPT | Performed by: OBSTETRICS & GYNECOLOGY

## 2024-07-31 NOTE — PROGRESS NOTES
Ambulatory Visit  Name: Ashley Emanuel      : 1970      MRN: [id number]  Encounter Provider: Nehemias Voss DO  Encounter Date: 2024   Encounter department: [facility]    Assessment & Plan   1. PMB (postmenopausal bleeding)  2. Endometrial thickening on ultrasound  Discussed options including reattempt at endometrial sampling by D&C.  Understanding that because of her prior endometrial ablation I may not be able to adequately sample endometrium to rule out cancer.  We discussed proceeding with a hysterectomy.  At this point patient desires hysterectomy.  The plan will be to proceed with a [facility] BSO.  The risks of the surgery were discussed including, but not limited to, infection, hemorrhage, bowel bladder or vascular injury, possible need for laparotomy.    History of Present Illness     Ashley Emanuel is a 54 y.o. female who presents complaining of postmenopausal bleeding.  It has been approximately 7 months since her last period.  She had an episode of bleeding in mid to end of July.  She presented to the office for transvaginal scan.  This revealed endometrial thickening around 12 mm.  Unable to obtain endometrial biopsy due to cervical stenosis, and likely intrauterine scarring post endometrial ablation   Patient has had 2 prior  sections    Review of Systems  Past Medical History   No past medical history on file.  Past Surgical History:   Procedure Laterality Date    BREAST BIOPSY      age 39     SECTION      COLONOSCOPY  2022    ENDOMETRIAL BIOPSY      HYSTEROSCOPY W/ ENDOMETRIAL ABLATION      HYSTEROSCOPY W/ POLYPECTOMY      TUBAL LIGATION       Family History   Problem Relation Age of Onset    Hypertension Mother     Hyperlipidemia Mother     Aortic aneurysm Father     No Known Problems Sister     No Known Problems Sister     Esophageal cancer Paternal Grandmother 89    No Known Problems Maternal Aunt     No Known Problems  Paternal Aunt     Breast cancer Neg Hx      Current Outpatient Medications on File Prior to Visit   Medication Sig Dispense Refill    clobetasol (TEMOVATE) 0.05 % cream Apply topically 2 (two) times a day 30 g 0    loratadine (CLARITIN) 10 mg tablet Take by mouth (Patient not taking: Reported on 8/11/2023)       No current facility-administered medications on file prior to visit.     Allergies   Allergen Reactions    Pollen Extract Allergic Rhinitis      Current Outpatient Medications on File Prior to Visit   Medication Sig Dispense Refill    clobetasol (TEMOVATE) 0.05 % cream Apply topically 2 (two) times a day 30 g 0    loratadine (CLARITIN) 10 mg tablet Take by mouth (Patient not taking: Reported on 8/11/2023)       No current facility-administered medications on file prior to visit.      Social History     Tobacco Use    Smoking status: Never    Smokeless tobacco: Never   Vaping Use    Vaping status: Never Used   Substance and Sexual Activity    Alcohol use: No    Drug use: No    Sexual activity: Yes     Comment: tubal ablation     Objective     There were no vitals taken for this visit.    Physical Exam  Vitals reviewed.   Constitutional:       Appearance: Normal appearance. She is normal weight.   Cardiovascular:      Rate and Rhythm: Normal rate and regular rhythm.      Pulses: Normal pulses.      Heart sounds: Normal heart sounds.   Pulmonary:      Effort: Pulmonary effort is normal.      Breath sounds: Normal breath sounds.   Abdominal:      Palpations: Abdomen is soft.      Hernia: There is no hernia in the left inguinal area or right inguinal area.   Genitourinary:     General: Normal vulva.      Labia:         Right: No rash, tenderness or lesion.         Left: No rash, tenderness or lesion.       Vagina: Normal.      Cervix: Normal.      Uterus: Normal.       Adnexa:         Right: No mass, tenderness or fullness.          Left: No mass, tenderness or fullness.     Lymphadenopathy:      Lower Body: No  right inguinal adenopathy. No left inguinal adenopathy.   Neurological:      Mental Status: She is alert.       Administrative Statements   I have spent a total time of 60 minutes in caring for this patient on the day of the visit/encounter including Diagnostic results, Prognosis, Risks and benefits of tx options, Impressions, Counseling / Coordination of care, Documenting in the medical record, Reviewing / ordering tests, medicine, procedures  , and Obtaining or reviewing history  .

## 2024-07-31 NOTE — PROGRESS NOTES
AMB US Pelvic Non OB    Date/Time: 7/31/2024 12:30 PM    Performed by: Mable Cardoso  Authorized by: Nehemias Voss DO  Universal Protocol:  Consent: Verbal consent obtained.  Consent given by: patient  Timeout called at: 7/31/2024 12:42 PM.  Patient understanding: patient states understanding of the procedure being performed  Patient identity confirmed: verbally with patient    Procedure details:     SIS Procedure: No    Indications: non-obstetric vaginal bleeding      Technique:  Transvaginal US, Non-OB    Position: lithotomy exam    Uterine findings:     Length (cm): 8.59    Height (cm):  5.36    Width (cm):  6.43    Endometrial stripe: identified      Endometrium thickness (mm):  12.01  Left ovary findings:     Left ovary:  Visualized    Length (cm): 2.33    Height (cm): 1.83    Width (cm): 1.36  Right ovary findings:     Right ovary:  Visualized    Length (cm): 5.53    Height (cm): 3.94    Width (cm): 4.3  Other findings:     Free pelvic fluid: not identified      Free peritoneal fluid: not identified    Post-Procedure Details:     Impression:  Retroverted uterus is inhomogeneous throughout with multiple areas of decreased echogenicity throughout without distinct fibroids noted. The endometrium is thickened. The left ovary appears within normal limits. The right ovary demonstrates a 3.8cm simple cyst.  No free fluid.       Tolerance:  Tolerated well, no immediate complications    Complications: no complications    Additional Procedure Comments:      GE Voluson P8 transvaginal transducer RIC5-RA with Serial Number [id number] was used during procedure and subsequently cleaned with high level disinfection utilizing the [employer] .     Ultrasound performed at:     [facility]  [address] [place] [address]  Phone: [phone]  Fax:  [phone]      Endometrial biopsy    Date/Time: 7/31/2024 12:30 PM    Performed by: Nehemias  DO Shanika  Authorized by: Nehemias Voss DO  Universal Protocol:  Consent: Verbal consent obtained.  Consent given by: patient  Timeout called at: 7/31/2024 12:42 PM.  Patient understanding: patient states understanding of the procedure being performed  Patient identity confirmed: verbally with patient    Procedure:     Procedure: endometrial biopsy with Pipelle      A bivalve speculum was placed in the vagina: yes      The cervix was dilated: yes      Specimen collected: insufficient sample size      Patient tolerated procedure well with no complications: yes      Unable to perform due to: cervical stenosis    Findings:     Cervix: stenotic    Comments:     Procedure comments:  Biopsy was attempted, however unsuccessful due to cervical stenosis

## 2024-08-01 ENCOUNTER — TELEPHONE (OUTPATIENT)
Dept: OBGYN CLINIC | Facility: CLINIC | Age: 54
End: 2024-08-01

## 2024-08-01 NOTE — TELEPHONE ENCOUNTER
----- Message from Nehemias Voss DO sent at 2024  1:02 PM EDT -----  [facility] GYN Department  Surgery Scheduling Sheet    Patient Name: Ashley Emanuel  : 1970    Provider: Nehemias Voss DO     Needed: no; Language: N/A    Procedure: exam under anesthesia and [facility] BSO    Diagnosis: Postmenopausal/endometrial thickening on ultrasound, prior endometrial ablation    Special Needs or Equipment: none    Anesthesia: General anesthesia    Length of stay: outpatient  Does patient have comorbid conditions that will require close perioperative monitoring prior to safe discharge: no    The patient has comorbid conditions that will require close perioperative monitoring prior to safe discharge, including N/A.   This may require acute care beyond the usual and routine recovery period. As such, inpatient admission post-operatively is expected and appropriate, and anticipated hospital length of stay will be >2 midnights.    Pre-Admission Testing Needed: yes   Labs that should be ordered: cbc, hgb A1C, type and screen, and EKG    Order PAT that is recommended in prep for procedure?: No    Medical Clearance Needed: no; Provider: N/A    MA Form Signed (tubals/hysterectomy): Not Indicated    Surgical Drink Given: yes     How many days out of work: 4 week(s)     How many days no drivin week(s)       Is pre op appt needed?  no  Interval for post op appt: 2 week(s)     For Surgical Scheduler:     Surgery Scheduled On:  [place]: [facility]    Pre-op Appt:   Post op Appt:  Consult/Medical clearance appt:

## 2024-08-07 ENCOUNTER — TELEPHONE (OUTPATIENT)
Age: 54
End: 2024-08-07

## 2024-08-07 NOTE — TELEPHONE ENCOUNTER
Pt called looking to speak with you regarding upcoming procedure. Pt wanted to communicate that her  is going to be out of the country 10/11 - 10/21, was looking to have Sx in Sept or after he returns in order to have a caretaker at home. Please F/U at your earliest convenience in regard to scheduling. Thank you.

## 2024-08-14 ENCOUNTER — ANNUAL EXAM (OUTPATIENT)
Dept: GYNECOLOGY | Facility: CLINIC | Age: 54
End: 2024-08-14
Payer: COMMERCIAL

## 2024-08-14 VITALS
BODY MASS INDEX: 23.92 KG/M2 | DIASTOLIC BLOOD PRESSURE: 76 MMHG | HEIGHT: 62 IN | HEART RATE: 90 BPM | WEIGHT: 130 LBS | SYSTOLIC BLOOD PRESSURE: 118 MMHG

## 2024-08-14 DIAGNOSIS — Z13.820 SCREENING FOR OSTEOPOROSIS: ICD-10-CM

## 2024-08-14 DIAGNOSIS — Z01.419 ENCOUNTER FOR GYNECOLOGICAL EXAMINATION WITHOUT ABNORMAL FINDING: Primary | ICD-10-CM

## 2024-08-14 PROCEDURE — S0612 ANNUAL GYNECOLOGICAL EXAMINA: HCPCS | Performed by: OBSTETRICS & GYNECOLOGY

## 2024-08-14 PROCEDURE — G0145 SCR C/V CYTO,THINLAYER,RESCR: HCPCS | Performed by: OBSTETRICS & GYNECOLOGY

## 2024-08-14 PROCEDURE — 76977 US BONE DENSITY MEASURE: CPT | Performed by: OBSTETRICS & GYNECOLOGY

## 2024-08-14 NOTE — PROGRESS NOTES
Ambulatory Visit  Name: Ashley Emanuel      : 1970      MRN: [id number]  Encounter Provider: Nehemias Voss DO  Encounter Date: 2024   Encounter department: [place] [facility]    Assessment & Plan   1. Encounter for gynecological examination without abnormal finding  -     Liquid-based pap, screening  2. Screening for osteoporosis    Peripheral scan: +0.6    History of Present Illness     Ashley Emanuel is a 54 y.o. female who presents for annual examination.  She is actually scheduled for a total laparoscopic hysterectomy with bilateral salpingo-oophorectomy in the near future secondary to postmenopausal bleeding along with inability to obtain endometrial sampling secondary to scarring intrauterine post ablation along with cervical stenosis.  She is having scant vaginal bleeding at this time.  Denies any dysuria, hematuria urgency urinary incontinence.  No GI complaints.    Colonoscopy 2022.  Polyps identified.  Advised repeat in 3 years.    Osteoporosis screening via peripheral scan 2022.  0.3    Review of Systems   Constitutional: Negative.    HENT:  Negative for sore throat and trouble swallowing.    Gastrointestinal: Negative.    Genitourinary: Negative.      Past Medical History   History reviewed. No pertinent past medical history.  Past Surgical History:   Procedure Laterality Date    BREAST BIOPSY      age 39     SECTION      COLONOSCOPY  2022    ENDOMETRIAL BIOPSY      HYSTEROSCOPY W/ ENDOMETRIAL ABLATION      HYSTEROSCOPY W/ POLYPECTOMY      TUBAL LIGATION       Family History   Problem Relation Age of Onset    Hypertension Mother     Hyperlipidemia Mother     Aortic aneurysm Father     No Known Problems Sister     No Known Problems Sister     Esophageal cancer Paternal Grandmother 89    No Known Problems Maternal Aunt     No Known Problems Paternal Aunt     Breast cancer Neg Hx      Current Outpatient Medications on File Prior to  "Visit   Medication Sig Dispense Refill    loratadine (CLARITIN) 10 mg tablet Take by mouth (Patient not taking: Reported on 8/11/2023)      [DISCONTINUED] clobetasol (TEMOVATE) 0.05 % cream Apply topically 2 (two) times a day 30 g 0     No current facility-administered medications on file prior to visit.     Allergies   Allergen Reactions    Pollen Extract Allergic Rhinitis      Current Outpatient Medications on File Prior to Visit   Medication Sig Dispense Refill    loratadine (CLARITIN) 10 mg tablet Take by mouth (Patient not taking: Reported on 8/11/2023)      [DISCONTINUED] clobetasol (TEMOVATE) 0.05 % cream Apply topically 2 (two) times a day 30 g 0     No current facility-administered medications on file prior to visit.      Social History     Tobacco Use    Smoking status: Never    Smokeless tobacco: Never   Vaping Use    Vaping status: Never Used   Substance and Sexual Activity    Alcohol use: No    Drug use: No    Sexual activity: Yes     Comment: tubal ablation     Objective     /76   Pulse 90   Ht 5' 2\" (1.575 m)   Wt 59 kg (130 lb)   BMI 23.78 kg/m²     Physical Exam  Vitals reviewed.   Constitutional:       Appearance: Normal appearance. She is normal weight.   Cardiovascular:      Rate and Rhythm: Normal rate and regular rhythm.      Pulses: Normal pulses.      Heart sounds: Normal heart sounds. No murmur heard.  Pulmonary:      Effort: Pulmonary effort is normal.      Breath sounds: Normal breath sounds.   Chest:   Breasts:     Right: No swelling, bleeding, inverted nipple, mass, nipple discharge, skin change or tenderness.      Left: No swelling, bleeding, inverted nipple, mass, nipple discharge, skin change or tenderness.   Abdominal:      General: There is no distension.      Palpations: Abdomen is soft. There is no mass.      Tenderness: There is no abdominal tenderness. There is no guarding or rebound.      Hernia: No hernia is present. There is no hernia in the left inguinal area or " right inguinal area.   Genitourinary:     General: Normal vulva.      Labia:         Right: No rash, tenderness or lesion.         Left: No rash, tenderness or lesion.       Vagina: Normal.      Cervix: Normal.      Uterus: Normal.       Adnexa:         Right: No mass, tenderness or fullness.          Left: No mass, tenderness or fullness.     Musculoskeletal:      Cervical back: Normal range of motion and neck supple. No tenderness.   Lymphadenopathy:      Cervical: No cervical adenopathy.      Upper Body:      Right upper body: No supraclavicular, axillary or pectoral adenopathy.      Left upper body: No supraclavicular, axillary or pectoral adenopathy.      Lower Body: No right inguinal adenopathy. No left inguinal adenopathy.   Neurological:      Mental Status: She is alert.       Administrative Statements

## 2024-08-21 LAB
LAB AP GYN PRIMARY INTERPRETATION: NORMAL
Lab: NORMAL

## 2024-08-28 ENCOUNTER — TELEPHONE (OUTPATIENT)
Age: 54
End: 2024-08-28

## 2024-08-28 NOTE — TELEPHONE ENCOUNTER
Pt calling to follow up to see if surgery scheduler was able to bump hysterectomy procedure sooner than November. Pt concerned as she started bleeding again yesterday. Per patient, bleeding is light and she is experiencing some cramping (did not take ibuprofen yet for them). She is very anxious and worried about bleeding again, and would like recommendations from Dr Voss. RN advised pt to continue monitoring symptoms and call back if bleeding becomes heavy, she passes large clots, or cramping becomes severe. Go to ED if feeling like she is about to pass out. Advised to start taking 600mg of ibuprofen every 6 hours for cramping and bleeding. RN will route message to provider and surgery scheduler per patient's request. Staff will return a call with update. Pt verbalized an understanding. No further questions.

## 2024-09-03 ENCOUNTER — NURSE TRIAGE (OUTPATIENT)
Age: 54
End: 2024-09-03

## 2024-09-03 NOTE — TELEPHONE ENCOUNTER
Patient called and has few questions about upcoming surgery 9/16 with Dr. Burch. She recently attended lecture by a Cancer surgeon and has few questions about her procedure that she would like to discuss. Patient prefers a phone call to discuss but would make appointment if the provider feels this is necessary.

## 2024-09-04 ENCOUNTER — APPOINTMENT (OUTPATIENT)
Dept: LAB | Facility: HOSPITAL | Age: 54
End: 2024-09-04
Payer: COMMERCIAL

## 2024-09-04 DIAGNOSIS — Z01.818 PREOP TESTING: ICD-10-CM

## 2024-09-04 LAB
ABO GROUP BLD: NORMAL
ATRIAL RATE: 65 BPM
BASOPHILS # BLD AUTO: 0.07 THOUSANDS/ÂΜL (ref 0–0.1)
BASOPHILS NFR BLD AUTO: 1 % (ref 0–1)
BLD GP AB SCN SERPL QL: NEGATIVE
EOSINOPHIL # BLD AUTO: 0.23 THOUSAND/ÂΜL (ref 0–0.61)
EOSINOPHIL NFR BLD AUTO: 4 % (ref 0–6)
ERYTHROCYTE [DISTWIDTH] IN BLOOD BY AUTOMATED COUNT: 12.9 % (ref 11.6–15.1)
EST. AVERAGE GLUCOSE BLD GHB EST-MCNC: 108 MG/DL
HBA1C MFR BLD: 5.4 %
HCT VFR BLD AUTO: 39.7 % (ref 34.8–46.1)
HGB BLD-MCNC: 13.5 G/DL (ref 11.5–15.4)
IMM GRANULOCYTES # BLD AUTO: 0.02 THOUSAND/UL (ref 0–0.2)
IMM GRANULOCYTES NFR BLD AUTO: 0 % (ref 0–2)
LYMPHOCYTES # BLD AUTO: 1.3 THOUSANDS/ÂΜL (ref 0.6–4.47)
LYMPHOCYTES NFR BLD AUTO: 22 % (ref 14–44)
MCH RBC QN AUTO: 29.5 PG (ref 26.8–34.3)
MCHC RBC AUTO-ENTMCNC: 34 G/DL (ref 31.4–37.4)
MCV RBC AUTO: 87 FL (ref 82–98)
MONOCYTES # BLD AUTO: 0.6 THOUSAND/ÂΜL (ref 0.17–1.22)
MONOCYTES NFR BLD AUTO: 10 % (ref 4–12)
NEUTROPHILS # BLD AUTO: 3.62 THOUSANDS/ÂΜL (ref 1.85–7.62)
NEUTS SEG NFR BLD AUTO: 63 % (ref 43–75)
NRBC BLD AUTO-RTO: 0 /100 WBCS
P AXIS: 45 DEGREES
PLATELET # BLD AUTO: 241 THOUSANDS/UL (ref 149–390)
PMV BLD AUTO: 10.2 FL (ref 8.9–12.7)
PR INTERVAL: 138 MS
QRS AXIS: 19 DEGREES
QRSD INTERVAL: 74 MS
QT INTERVAL: 400 MS
QTC INTERVAL: 416 MS
RBC # BLD AUTO: 4.57 MILLION/UL (ref 3.81–5.12)
RH BLD: POSITIVE
SPECIMEN EXPIRATION DATE: NORMAL
T WAVE AXIS: 27 DEGREES
VENTRICULAR RATE: 65 BPM
WBC # BLD AUTO: 5.84 THOUSAND/UL (ref 4.31–10.16)

## 2024-09-04 PROCEDURE — 83036 HEMOGLOBIN GLYCOSYLATED A1C: CPT

## 2024-09-04 PROCEDURE — 86901 BLOOD TYPING SEROLOGIC RH(D): CPT

## 2024-09-04 PROCEDURE — 93005 ELECTROCARDIOGRAM TRACING: CPT

## 2024-09-04 PROCEDURE — 86850 RBC ANTIBODY SCREEN: CPT

## 2024-09-04 PROCEDURE — 93010 ELECTROCARDIOGRAM REPORT: CPT | Performed by: INTERNAL MEDICINE

## 2024-09-04 PROCEDURE — 36415 COLL VENOUS BLD VENIPUNCTURE: CPT

## 2024-09-04 PROCEDURE — 86900 BLOOD TYPING SEROLOGIC ABO: CPT

## 2024-09-04 PROCEDURE — 85025 COMPLETE CBC W/AUTO DIFF WBC: CPT

## 2024-09-05 ENCOUNTER — TELEPHONE (OUTPATIENT)
Age: 54
End: 2024-09-05

## 2024-09-05 NOTE — TELEPHONE ENCOUNTER
Spoke with patient who reports she did her pre-op testing and her EKG says abnormal.  She would like clarification.

## 2024-09-05 NOTE — PRE-PROCEDURE INSTRUCTIONS
Pre-Surgery Instructions:   Medication Instructions    NON FORMULARY Stop taking 7 days prior to surgery. (Emergency -C vitamin supplement )    Pt reports taking no further prescription medications/OTC vitamins or herbals with PAT call. Instructed pt to NOT start anything new prior to surgery.   Pt instructed on use of cleanser for DOS and instructions for showering both night before and DOS reviewed with pt - pt verbalized understanding of instructions given  Pt also instructed on no hair or body products on skin for DOS.  No shaving with a razor blade for 7 days prior to surgery to decrease any incident of infection - electric razor is ok to use up till 24 hrs prior to DOS.  Pt instructed that if with any changes in health status between now and DOS - notify surgeon office.  Tylenol is ok to use as needed up to and including DOS with sips of water - if needed - did instruct NO NSAIDS to be used at least 3 days prior to surgery - or if given a longer hold time of NSAIDS from MD please follow MD hold instructions.  Instructed to bring photo ID and medical insurance card for DOS as forms of identification for DOS.  Also instructed pt on no jewelry or body piercings, no valuables on body for DOS. Contact lenses, if worn - need to be removed for DOS.  Pt instructed does need transport home after surgery- pt is not allowed to drive.    Pt reports has Ensure drinks x3 from office with instructions - instructed pt if with any questions regarding use of drinks to call surgeon office or clarify with pre op call .    Medication instructions for day surgery reviewed. Please use only a sip of water to take your instructed medications. Avoid all over the counter vitamins, supplements and NSAIDS for one week prior to surgery per anesthesia guidelines. Tylenol is ok to take as needed.     You will receive a call one business day prior to surgery with an arrival time and hospital directions. If your surgery is scheduled on a  Monday, the hospital will be calling you on the Friday prior to your surgery. If you have not heard from anyone by 8pm, please call the hospital supervisor through the hospital  at [phone]. ([place] [phone] or [place] [phone]).    Do not eat or drink anything after midnight the night before your surgery, including candy, mints, lifesavers, or chewing gum. Do not drink alcohol 24hrs before your surgery. Try not to smoke at least 24hrs before your surgery.       Follow the pre surgery showering instructions as listed in the “My Surgical Experience Booklet” or otherwise provided by your surgeon's office. Do not use a blade to shave the surgical area 1 week before surgery. It is okay to use a clean electric clippers up to 24 hours before surgery. Do not apply any lotions, creams, including makeup, cologne, deodorant, or perfumes after showering on the day of your surgery. Do not use dry shampoo, hair spray, hair gel, or any type of hair products.     No contact lenses, eye make-up, or artificial eyelashes. Remove nail polish, including gel polish, and any artificial, gel, or acrylic nails if possible. Remove all jewelry including rings and body piercing jewelry.     Wear causal clothing that is easy to take on and off. Consider your type of surgery.    Keep any valuables, jewelry, piercings at home. Please bring any specially ordered equipment (sling, braces) if indicated.    Arrange for a responsible person to drive you to and from the hospital on the day of your surgery. Please confirm the visitor policy for the day of your procedure when you receive your phone call with an arrival time.     Call the surgeon's office with any new illnesses, exposures, or additional questions prior to surgery.    Please reference your “My Surgical Experience Booklet” for additional information to prepare for your upcoming surgery.

## 2024-09-13 ENCOUNTER — ANESTHESIA EVENT (OUTPATIENT)
Dept: PERIOP | Facility: HOSPITAL | Age: 54
End: 2024-09-13
Payer: COMMERCIAL

## 2024-09-13 ENCOUNTER — TELEPHONE (OUTPATIENT)
Age: 54
End: 2024-09-13

## 2024-09-13 NOTE — TELEPHONE ENCOUNTER
Patient's post op appointment is scheduled for 11/26/24. She would like to have the post op appointment rescheduled to 2 weeks after her surgery, scheduled for 9/16/24. Contact number on file.

## 2024-09-16 ENCOUNTER — ANESTHESIA (OUTPATIENT)
Dept: PERIOP | Facility: HOSPITAL | Age: 54
End: 2024-09-16
Payer: COMMERCIAL

## 2024-09-16 ENCOUNTER — HOSPITAL ENCOUNTER (OUTPATIENT)
Facility: HOSPITAL | Age: 54
Setting detail: OUTPATIENT SURGERY
Discharge: HOME | End: 2024-09-16
Attending: OBSTETRICS & GYNECOLOGY | Admitting: OBSTETRICS & GYNECOLOGY
Payer: COMMERCIAL

## 2024-09-16 VITALS
HEART RATE: 71 BPM | RESPIRATION RATE: 16 BRPM | SYSTOLIC BLOOD PRESSURE: 138 MMHG | HEIGHT: 63 IN | TEMPERATURE: 97.1 F | OXYGEN SATURATION: 98 % | BODY MASS INDEX: 23.05 KG/M2 | DIASTOLIC BLOOD PRESSURE: 83 MMHG | WEIGHT: 130.07 LBS

## 2024-09-16 DIAGNOSIS — G89.18 POSTOPERATIVE PAIN: Primary | ICD-10-CM

## 2024-09-16 DIAGNOSIS — R93.89 THICKENED ENDOMETRIUM: ICD-10-CM

## 2024-09-16 DIAGNOSIS — N95.0 PMB (POSTMENOPAUSAL BLEEDING): ICD-10-CM

## 2024-09-16 LAB
ABO GROUP BLD: NORMAL
EXT PREGNANCY TEST URINE: NEGATIVE
EXT. CONTROL: NORMAL
RH BLD: POSITIVE

## 2024-09-16 PROCEDURE — 81025 URINE PREGNANCY TEST: CPT | Performed by: ANESTHESIOLOGY

## 2024-09-16 PROCEDURE — 88307 TISSUE EXAM BY PATHOLOGIST: CPT | Performed by: PATHOLOGY

## 2024-09-16 PROCEDURE — NC001 PR NO CHARGE: Performed by: OBSTETRICS & GYNECOLOGY

## 2024-09-16 RX ORDER — IBUPROFEN 600 MG/1
600 TABLET, FILM COATED ORAL EVERY 6 HOURS PRN
Status: DISCONTINUED | OUTPATIENT
Start: 2024-09-16 | End: 2024-09-16 | Stop reason: HOSPADM

## 2024-09-16 RX ORDER — OXYCODONE AND ACETAMINOPHEN 5; 325 MG/1; MG/1
1 TABLET ORAL EVERY 4 HOURS PRN
Qty: 15 TABLET | Refills: 0 | Status: SHIPPED | OUTPATIENT
Start: 2024-09-16

## 2024-09-16 RX ORDER — PROPOFOL 10 MG/ML
INJECTION, EMULSION INTRAVENOUS AS NEEDED
Status: DISCONTINUED | OUTPATIENT
Start: 2024-09-16 | End: 2024-09-16

## 2024-09-16 RX ORDER — MIDAZOLAM HYDROCHLORIDE 2 MG/2ML
INJECTION, SOLUTION INTRAMUSCULAR; INTRAVENOUS AS NEEDED
Status: DISCONTINUED | OUTPATIENT
Start: 2024-09-16 | End: 2024-09-16

## 2024-09-16 RX ORDER — ROCURONIUM BROMIDE 10 MG/ML
INJECTION, SOLUTION INTRAVENOUS AS NEEDED
Status: DISCONTINUED | OUTPATIENT
Start: 2024-09-16 | End: 2024-09-16

## 2024-09-16 RX ORDER — FENTANYL CITRATE 50 UG/ML
INJECTION, SOLUTION INTRAMUSCULAR; INTRAVENOUS AS NEEDED
Status: DISCONTINUED | OUTPATIENT
Start: 2024-09-16 | End: 2024-09-16

## 2024-09-16 RX ORDER — LIDOCAINE HCL/PF 100 MG/5ML
SYRINGE (ML) INJECTION AS NEEDED
Status: DISCONTINUED | OUTPATIENT
Start: 2024-09-16 | End: 2024-09-16

## 2024-09-16 RX ORDER — DEXAMETHASONE SODIUM PHOSPHATE 10 MG/ML
INJECTION, SOLUTION INTRAMUSCULAR; INTRAVENOUS AS NEEDED
Status: DISCONTINUED | OUTPATIENT
Start: 2024-09-16 | End: 2024-09-16

## 2024-09-16 RX ORDER — ONDANSETRON 2 MG/ML
4 INJECTION INTRAMUSCULAR; INTRAVENOUS EVERY 6 HOURS PRN
Status: DISCONTINUED | OUTPATIENT
Start: 2024-09-16 | End: 2024-09-16 | Stop reason: HOSPADM

## 2024-09-16 RX ORDER — KETOROLAC TROMETHAMINE 30 MG/ML
INJECTION, SOLUTION INTRAMUSCULAR; INTRAVENOUS AS NEEDED
Status: DISCONTINUED | OUTPATIENT
Start: 2024-09-16 | End: 2024-09-16

## 2024-09-16 RX ORDER — MAGNESIUM HYDROXIDE 1200 MG/15ML
LIQUID ORAL AS NEEDED
Status: DISCONTINUED | OUTPATIENT
Start: 2024-09-16 | End: 2024-09-16 | Stop reason: HOSPADM

## 2024-09-16 RX ORDER — OXYCODONE HYDROCHLORIDE 5 MG/1
5 TABLET ORAL EVERY 4 HOURS PRN
Status: DISCONTINUED | OUTPATIENT
Start: 2024-09-16 | End: 2024-09-16 | Stop reason: HOSPADM

## 2024-09-16 RX ORDER — ONDANSETRON 2 MG/ML
INJECTION INTRAMUSCULAR; INTRAVENOUS AS NEEDED
Status: DISCONTINUED | OUTPATIENT
Start: 2024-09-16 | End: 2024-09-16

## 2024-09-16 RX ORDER — SODIUM CHLORIDE, SODIUM LACTATE, POTASSIUM CHLORIDE, CALCIUM CHLORIDE 600; 310; 30; 20 MG/100ML; MG/100ML; MG/100ML; MG/100ML
INJECTION, SOLUTION INTRAVENOUS CONTINUOUS PRN
Status: DISCONTINUED | OUTPATIENT
Start: 2024-09-16 | End: 2024-09-16

## 2024-09-16 RX ORDER — CEFAZOLIN SODIUM 1 G/50ML
SOLUTION INTRAVENOUS AS NEEDED
Status: DISCONTINUED | OUTPATIENT
Start: 2024-09-16 | End: 2024-09-16

## 2024-09-16 RX ORDER — HYDROMORPHONE HCL/PF 1 MG/ML
0.5 SYRINGE (ML) INJECTION
Status: DISCONTINUED | OUTPATIENT
Start: 2024-09-16 | End: 2024-09-16 | Stop reason: HOSPADM

## 2024-09-16 RX ORDER — SODIUM CHLORIDE 9 MG/ML
INJECTION, SOLUTION INTRAVENOUS CONTINUOUS PRN
Status: DISCONTINUED | OUTPATIENT
Start: 2024-09-16 | End: 2024-09-16

## 2024-09-16 RX ORDER — ACETAMINOPHEN 325 MG/1
975 TABLET ORAL EVERY 6 HOURS PRN
Status: DISCONTINUED | OUTPATIENT
Start: 2024-09-16 | End: 2024-09-16 | Stop reason: HOSPADM

## 2024-09-16 RX ORDER — ONDANSETRON 2 MG/ML
4 INJECTION INTRAMUSCULAR; INTRAVENOUS ONCE AS NEEDED
Status: DISCONTINUED | OUTPATIENT
Start: 2024-09-16 | End: 2024-09-16 | Stop reason: HOSPADM

## 2024-09-16 RX ORDER — SODIUM CHLORIDE 9 MG/ML
INJECTION, SOLUTION INTRAVENOUS AS NEEDED
Status: DISCONTINUED | OUTPATIENT
Start: 2024-09-16 | End: 2024-09-16 | Stop reason: HOSPADM

## 2024-09-16 RX ORDER — SODIUM CHLORIDE 9 MG/ML
125 INJECTION, SOLUTION INTRAVENOUS CONTINUOUS
Status: DISCONTINUED | OUTPATIENT
Start: 2024-09-16 | End: 2024-09-16 | Stop reason: HOSPADM

## 2024-09-16 RX ADMIN — ONDANSETRON 4 MG: 2 INJECTION INTRAMUSCULAR; INTRAVENOUS at 12:09

## 2024-09-16 RX ADMIN — FENTANYL CITRATE 50 MCG: 50 INJECTION INTRAMUSCULAR; INTRAVENOUS at 12:13

## 2024-09-16 RX ADMIN — LIDOCAINE HYDROCHLORIDE 100 MG: 20 INJECTION INTRAVENOUS at 10:37

## 2024-09-16 RX ADMIN — KETOROLAC TROMETHAMINE 30 MG: 30 INJECTION, SOLUTION INTRAMUSCULAR; INTRAVENOUS at 12:09

## 2024-09-16 RX ADMIN — SODIUM CHLORIDE 125 ML/HR: 0.9 INJECTION, SOLUTION INTRAVENOUS at 09:47

## 2024-09-16 RX ADMIN — FENTANYL CITRATE 50 MCG: 50 INJECTION INTRAMUSCULAR; INTRAVENOUS at 10:37

## 2024-09-16 RX ADMIN — DEXAMETHASONE SODIUM PHOSPHATE 10 MG: 10 INJECTION INTRAMUSCULAR; INTRAVENOUS at 10:37

## 2024-09-16 RX ADMIN — SODIUM CHLORIDE, SODIUM LACTATE, POTASSIUM CHLORIDE, AND CALCIUM CHLORIDE: .6; .31; .03; .02 INJECTION, SOLUTION INTRAVENOUS at 11:05

## 2024-09-16 RX ADMIN — CEFAZOLIN SODIUM 1000 MG: 1 SOLUTION INTRAVENOUS at 10:41

## 2024-09-16 RX ADMIN — FENTANYL CITRATE 50 MCG: 50 INJECTION INTRAMUSCULAR; INTRAVENOUS at 12:30

## 2024-09-16 RX ADMIN — ROCURONIUM BROMIDE 50 MG: 10 INJECTION, SOLUTION INTRAVENOUS at 10:37

## 2024-09-16 RX ADMIN — PROPOFOL 50 MCG/KG/MIN: 10 INJECTION, EMULSION INTRAVENOUS at 10:41

## 2024-09-16 RX ADMIN — MIDAZOLAM 2 MG: 1 INJECTION INTRAMUSCULAR; INTRAVENOUS at 10:31

## 2024-09-16 RX ADMIN — FENTANYL CITRATE 50 MCG: 50 INJECTION INTRAMUSCULAR; INTRAVENOUS at 10:57

## 2024-09-16 RX ADMIN — IBUPROFEN 600 MG: 600 TABLET, FILM COATED ORAL at 14:12

## 2024-09-16 RX ADMIN — SODIUM CHLORIDE: 9 INJECTION, SOLUTION INTRAVENOUS at 10:41

## 2024-09-16 RX ADMIN — SUGAMMADEX 200 MG: 100 INJECTION, SOLUTION INTRAVENOUS at 12:19

## 2024-09-16 RX ADMIN — PROPOFOL 150 MG: 10 INJECTION, EMULSION INTRAVENOUS at 10:37

## 2024-09-16 RX ADMIN — INDIGOTINDISULFONATE SODIUM 40 MG: 8 INJECTION INTRAVENOUS at 12:06

## 2024-09-16 NOTE — ANESTHESIA POSTPROCEDURE EVALUATION
Post-Op Assessment Note    CV Status:  Stable  Pain Score: 0    Pain management: adequate    Multimodal analgesia used between 6 hours prior to anesthesia start to PACU discharge    Mental Status:  Awake   Hydration Status:  Stable   PONV Controlled:  None   Airway Patency:  Patent     Post Op Vitals Reviewed: Yes    No anethesia notable event occurred.    Staff: GM               /76 (09/16/24 1232)    Temp 97.7 °F (36.5 °C) (09/16/24 1232)    Pulse 73 (09/16/24 1232)   Resp 12 (09/16/24 1232)    SpO2 100 % (09/16/24 1232)

## 2024-09-16 NOTE — ANESTHESIA PREPROCEDURE EVALUATION
Procedure:  (LTH) W/ BSO & EUA (Bilateral: Abdomen)    Relevant Problems   Other  H/o dvt        Physical Exam    Airway    Mallampati score: II         Dental   No notable dental hx     Cardiovascular  Cardiovascular exam normal    Pulmonary  Pulmonary exam normal     Other Findings  post-pubertal.      Anesthesia Plan  ASA Score- 2     Anesthesia Type- general with ASA Monitors.         Additional Monitors:     Airway Plan: ETT.    Comment: Tap blocks if open.       Plan Factors-Exercise tolerance (METS): >4 METS.    Chart reviewed.        Patient is not a current smoker.              Induction- intravenous.    Postoperative Plan- Plan for postoperative opioid use. Planned trial extubation        Informed Consent- Anesthetic plan and risks discussed with patient.

## 2024-09-16 NOTE — OP NOTE
OPERATIVE REPORT  PATIENT NAME: Ashley Emanuel    :  1970  MRN: [id number]  Pt Location: [facility] OR ROOM 01    SURGERY DATE: 2024    Surgeons and Role:     * Nehemias Voss DO - Primary     * Bowen Fox MD - Assisting     * William Oquendo MD - Assisting    Preop Diagnosis:  PMB (postmenopausal bleeding) [N95.0]  Thickened endometrium [R93.89]    Post-Op Diagnosis Codes:     * PMB (postmenopausal bleeding) [N95.0]     * Thickened endometrium [R93.89]    Procedure(s):  Bilateral - (LTH) W/ BSO & EUA. CYSTO    Specimen(s):  ID Type Source Tests Collected by Time Destination   1 : UTERUS, CERVIX, BILATERAL FALLOPIAN TUBES AND OVARIES Tissue Uterus w/Bilateral Ovaries and Fallopian Tubes TISSUE EXAM Nehemias Voss DO 2024 1156        Estimated Blood Loss:   Minimal    Drains:  [REMOVED] Urethral Catheter Non-latex 16 Fr. (Removed)   Number of days: 0       Anesthesia Type:   General    Operative Indications:  PMB (postmenopausal bleeding) [N95.0]  Thickened endometrium [R93.89]      Operative Findings:  Normal external genitalia   Normal appearing cervix with minimal posterior lip  8wk fibroid uterus  Bilateral fallopian tubes s/p tubal ligation and bilateral ovaries WNL  The left IP ligament was calcified   Dense bladder adhesions to the BO  Cystoscopy with normal bladder wall mucosa without defects or sutures noted through the bladder lumen. Efflux visualized from bilateral ureteral orifices.       Complications:   None    Procedure and Technique:    Patient was taken to the operating room were a time out was performed to confirm correct patient and correct procedure. General endotracheal anesthesia (GET) was administered and the patient was positioned on the OR table in the dorsal lithotomy position. Arms were positioned in a neutral position at the sides and secured by tucking a draw sheet. All pressure points were padded and a sherri hugger was placed to maintain control of core body  temperature.The patient was prepped and draped in the usual sterile fashion with chloroprep on the abdomen and CHG prep on the vagina and perineum.    A weighted speculum was inserted into the vagina and a right angle retractor was used to visualize the anterior lip of the cervix, which was then grasped with a single toothed tenaculum. Under direct visualization the uterus was sounded to approximately 8 cm. Cervix was dilated for introduction of the DANIELLE uterine manipulator. DANIELLE was secured in place with a stitch of 0 Vicryl.  Franco catheter was placed and the intravaginal occluder balloon was inflated.  Sterile gloves were exchanged and attention was turned to the abdomen.    A 10 mm skin incision was made in the umbilicus with a scalpel for introduction of a 10mm trochar under direct visualization. Good intraperitoneal location was confirmed and pneumoperitoneum was created to maximal pressure of 15 mmHg.  The patient was placed in Trendelenburg and the above-mentioned findings were noted. Two additional 5mm trocars were placed in a similar fashion in the right and left lower quadrants, approximately 2cm superior and medial to the anterior superior iliac spine.    The ureters were clearly identified transperitoneally on both sides.  The infundibulopelvic ligaments were identified and divided at the level of the pelvic brim bilaterally with the LigaSure device. The round ligaments were cauterized and divided.  The vesicouterine peritoneal reflection was divided the LigaSure and blunt dissection and the bladder was mobilized anteriorly.  The uterine vessels were cauterized and divided bilaterally.  The cardinal ligaments were cauterized and divided bilaterally down to the level of the manipulator cup. At this point, we tried to identify the colpotomy come with the J hook cautery however we were unable to do so, a supracervical hysterectomy was performed and the colpotomy cup was identified afterwards and a  circumferential colpotomy was made. The two specimens were removed through the vagina.    The vaginal cuff was closed laparoscopically using a 2-0 Stratafix suture.  Airtight closure and excellent hemostasis was obtained.  Copious irrigation was used and hemostasis was confirmed at low intraperitoneal pressures.  All trocars were removed and pneumoperitoneum was completely released with the assistance of Valsalva maneuvers.  The skin at all port sites was closed using plain gut sutures in a simple interrupted fashion.    Vaginal exam revealed excellent closure and hemostasis.  The patient was given indigo cardime. The Franco catheter was removed.  The bladder was filled in retrograde fashion with approximately 300 mL of normal saline.  Using the 5 mm cystoscope, cystoscopy was performed and the above-mentioned findings were noted.  The bladder was emptied with a Franco catheter which was removed at the completion of the procedure.    At the conclusion of the procedure, all needle, sponge, and instrument counts were noted to be correct x2. Patient tolerated the procedure well and was transferred to PACU in stable condition.    Dr. Voss was present and participated in all key portions of the case.    I was present for the entire procedure.    Patient Disposition:  PACU              SIGNATURE: William Oquendo MD  DATE: September 16, 2024  TIME: 12:25 PM

## 2024-09-16 NOTE — H&P
1. PMB (postmenopausal bleeding)  2. Endometrial thickening on ultrasound  Discussed options including reattempt at endometrial sampling by D&C.  Understanding that because of her prior endometrial ablation I may not be able to adequately sample endometrium to rule out cancer.  We discussed proceeding with a hysterectomy.  At this point patient desires hysterectomy.  The plan will be to proceed with a H BSO.  The risks of the surgery were discussed including, but not limited to, infection, hemorrhage, bowel bladder or vascular injury, possible need for laparotomy.           History of Present Illness  Ashley Emanuel is a 54 y.o. female who presents complaining of postmenopausal bleeding.  It has been approximately 7 months since her last period.  She had an episode of bleeding in mid to end of July.  She presented to the office for transvaginal scan.  This revealed endometrial thickening around 12 mm.  Unable to obtain endometrial biopsy due to cervical stenosis, and likely intrauterine scarring post endometrial ablation   Patient has had 2 prior  sections     Review of Systems        Past Medical History  Medical History   No past medical history on file.     Surgical History         Past Surgical History:   Procedure Laterality Date    BREAST BIOPSY         age 39     SECTION        COLONOSCOPY   2022    ENDOMETRIAL BIOPSY        HYSTEROSCOPY W/ ENDOMETRIAL ABLATION        HYSTEROSCOPY W/ POLYPECTOMY        TUBAL LIGATION             Family History         Family History   Problem Relation Age of Onset    Hypertension Mother      Hyperlipidemia Mother      Aortic aneurysm Father      No Known Problems Sister      No Known Problems Sister      Esophageal cancer Paternal Grandmother 89    No Known Problems Maternal Aunt      No Known Problems Paternal Aunt      Breast cancer Neg Hx           Medications Ordered Prior to Encounter          Current Outpatient Medications on File Prior to Visit    Medication Sig Dispense Refill    clobetasol (TEMOVATE) 0.05 % cream Apply topically 2 (two) times a day 30 g 0    loratadine (CLARITIN) 10 mg tablet Take by mouth (Patient not taking: Reported on 8/11/2023)          No current facility-administered medications on file prior to visit.         Allergies        Allergies   Allergen Reactions    Pollen Extract Allergic Rhinitis            Medications Ordered Prior to Encounter          Current Outpatient Medications on File Prior to Visit   Medication Sig Dispense Refill    clobetasol (TEMOVATE) 0.05 % cream Apply topically 2 (two) times a day 30 g 0    loratadine (CLARITIN) 10 mg tablet Take by mouth (Patient not taking: Reported on 8/11/2023)          No current facility-administered medications on file prior to visit.         Social History               Tobacco Use    Smoking status: Never    Smokeless tobacco: Never   Vaping Use    Vaping status: Never Used   Substance and Sexual Activity    Alcohol use: No    Drug use: No    Sexual activity: Yes       Comment: tubal ablation            Objective[]Expand by Default  There were no vitals taken for this visit.     Physical Exam  Vitals reviewed.   Constitutional:       Appearance: Normal appearance. She is normal weight.   Cardiovascular:      Rate and Rhythm: Normal rate and regular rhythm.      Pulses: Normal pulses.      Heart sounds: Normal heart sounds.   Pulmonary:      Effort: Pulmonary effort is normal.      Breath sounds: Normal breath sounds.   Abdominal:      Palpations: Abdomen is soft.      Hernia: There is no hernia in the left inguinal area or right inguinal area.   Genitourinary:     General: Normal vulva.      Labia:         Right: No rash, tenderness or lesion.         Left: No rash, tenderness or lesion.       Vagina: Normal.      Cervix: Normal.      Uterus: Normal.       Adnexa:         Right: No mass, tenderness or fullness.          Left: No mass, tenderness or fullness.     Lymphadenopathy:       Lower Body: No right inguinal adenopathy. No left inguinal adenopathy.   Neurological:      Mental Status: She is alert.     P

## 2024-09-19 PROCEDURE — 88307 TISSUE EXAM BY PATHOLOGIST: CPT | Performed by: PATHOLOGY

## 2024-10-03 ENCOUNTER — OFFICE VISIT (OUTPATIENT)
Dept: GYNECOLOGY | Facility: CLINIC | Age: 54
End: 2024-10-03

## 2024-10-03 VITALS — BODY MASS INDEX: 23.04 KG/M2 | HEIGHT: 63 IN | WEIGHT: 130 LBS

## 2024-10-03 DIAGNOSIS — Z48.89 POSTOPERATIVE VISIT: Primary | ICD-10-CM

## 2024-10-03 PROCEDURE — 99024 POSTOP FOLLOW-UP VISIT: CPT | Performed by: OBSTETRICS & GYNECOLOGY

## 2024-10-03 NOTE — PROGRESS NOTES
Assessment/Plan:      Diagnoses and all orders for this visit:    Postoperative visit     Return to the office in 1 month for her second postoperative check    Subjective:     Patient ID: Ashley Emanuel is a 54 y.o. female.    HPI patient presents for postoperative check.  She was operated on September 16.  She had a [facility] BSO.  She is doing well since the surgery with no complaints.    Review of Systems      Objective:     Physical Exam    Port sites are healing well with no erythema exudate or induration.  Sutures removed.    Path report:  Final Diagnosis   A. Uterus, cervix, bilateral ovaries and fallopian tubes; hysterectomy and salpingo-oophorectomies (136.5g):  - Cervix: Benign cervix, negative for atypia or malignancy.  - Endometrium: Benign inactive to weakly proliferative phase endometrium, negative for hyperplasia, atypia, or carcinoma.  - Myometrium: Benign myometrium with small benign intramural leiomyomas and posterior deep glandular deposition compatible with adenomyosis versus surface endometriosis with cystic change, negative for atypia or malignancy.  - Right ovary: Benign ovarian parenchyma with focal central degenerated cyst with features suggesting benign serous cystadenoma, negative for atypia or malignancy.  - Left ovary: Benign ovarian parenchyma with hemorrhagic and cystic corpus luteum, negative for atypia or malignancy.  - Bilateral fallopian tubes: Benign bilateral fallopian tubes and fimbriae with benign left-sided paratubal cyst and features of previous tubal ligation, negative for atypia or malignancy.

## 2024-11-06 ENCOUNTER — OFFICE VISIT (OUTPATIENT)
Dept: GYNECOLOGY | Facility: CLINIC | Age: 54
End: 2024-11-06

## 2024-11-06 VITALS
HEIGHT: 63 IN | WEIGHT: 132 LBS | SYSTOLIC BLOOD PRESSURE: 130 MMHG | DIASTOLIC BLOOD PRESSURE: 84 MMHG | HEART RATE: 73 BPM | BODY MASS INDEX: 23.39 KG/M2

## 2024-11-06 DIAGNOSIS — Z48.89 POSTOPERATIVE VISIT: Primary | ICD-10-CM

## 2024-11-06 PROCEDURE — 99024 POSTOP FOLLOW-UP VISIT: CPT | Performed by: OBSTETRICS & GYNECOLOGY

## 2024-11-06 NOTE — PROGRESS NOTES
Assessment/Plan:      Diagnoses and all orders for this visit:    Postoperative visit          Subjective:     Patient ID: Ashley Emanuel is a 54 y.o. female.    HPI patient presents for second postoperative check.  She is status post [facility] BSO on September 16 for adenomyosis/endometriosis and a serous cystadenoma.  She denies any vaginal irritation, burning discharge or bleeding.  Patient is voiding well and no issues with bowel movements.    Review of Systems      Objective:     Physical Exam    Port sites are well-healed with no erythema exudate or induration.  On pelvic exam vaginal vault is healing well.  No abscess or hematoma palpated.  No adnexal masses.    Impression: Normal postoperative check    Plan: Return to the office as needed/annual

## 2025-02-17 ENCOUNTER — HOSPITAL ENCOUNTER (OUTPATIENT)
Dept: MAMMOGRAPHY | Facility: MEDICAL CENTER | Age: 55
Discharge: HOME | End: 2025-02-17
Payer: COMMERCIAL

## 2025-02-17 VITALS — WEIGHT: 132 LBS | HEIGHT: 63 IN | BODY MASS INDEX: 23.39 KG/M2

## 2025-02-17 DIAGNOSIS — Z12.31 ENCOUNTER FOR SCREENING MAMMOGRAM FOR MALIGNANT NEOPLASM OF BREAST: ICD-10-CM

## 2025-02-17 PROCEDURE — 77063 BREAST TOMOSYNTHESIS BI: CPT

## 2025-02-17 PROCEDURE — 77067 SCR MAMMO BI INCL CAD: CPT

## 2025-06-04 ENCOUNTER — TELEPHONE (OUTPATIENT)
Dept: GASTROENTEROLOGY | Facility: CLINIC | Age: 55
End: 2025-06-04

## 2025-06-04 NOTE — TELEPHONE ENCOUNTER
3y repeat colonoscopy    Scheduled date of colonoscopy (as of today):08.27.25  Physician performing colonoscopy:DR VIDALES  Location of colonoscopy:[place]  Bowel prep reviewed with patient:DUL/URI  Instructions reviewed with patient by:MAILED  Clearances: N/A    06/04/25  Screened by: Kostas Dotson MA    Referring Provider DR VIDALES    Pre- Screening:     There is no height or weight on file to calculate BMI.  Has patient been referred for a routine screening Colonoscopy? yes  Is the patient between 45-75 years old? yes      Previous Colonoscopy yes   If yes:    Date: 06/14/22    Facility: [place]    Reason:       SCHEDULING STAFF: If the patient is between 45yrs-49yrs, please advise patient to confirm benefits/coverage with their insurance company for a routine screening colonoscopy, some insurance carriers will only cover at 50yrs or older. If the patient is over 75years old, please schedule an office visit.     Does the patient want to see a Gastroenterologist prior to their procedure OR are they having any GI symptoms? no    Has the patient been hospitalized or had abdominal surgery in the past 6 months? no    Does the patient use supplemental oxygen? no    Does the patient take Coumadin, Lovenox, Plavix, Elliquis, Xarelto, or other blood thinning medication? no    Has the patient had a stroke, cardiac event, or stent placed in the past year? no    SCHEDULING STAFF: If patient answers NO to above questions, then schedule procedure. If patient answers YES to above questions, then schedule office appointment.     If patient is between 45yrs - 49yrs, please advise patient that we will have to confirm benefits & coverage with their insurance company for a routine screening colonoscopy.

## 2025-08-13 ENCOUNTER — ANESTHESIA EVENT (OUTPATIENT)
Dept: ANESTHESIOLOGY | Facility: HOSPITAL | Age: 55
End: 2025-08-13

## 2025-08-13 ENCOUNTER — ANESTHESIA (OUTPATIENT)
Dept: ANESTHESIOLOGY | Facility: HOSPITAL | Age: 55
End: 2025-08-13

## 2025-08-20 ENCOUNTER — ANNUAL EXAM (OUTPATIENT)
Dept: GYNECOLOGY | Facility: CLINIC | Age: 55
End: 2025-08-20
Payer: COMMERCIAL

## 2025-08-20 VITALS
SYSTOLIC BLOOD PRESSURE: 128 MMHG | WEIGHT: 129 LBS | HEIGHT: 63 IN | DIASTOLIC BLOOD PRESSURE: 78 MMHG | BODY MASS INDEX: 22.86 KG/M2

## 2025-08-20 DIAGNOSIS — Z01.419 ENCOUNTER FOR GYNECOLOGICAL EXAMINATION WITHOUT ABNORMAL FINDING: ICD-10-CM

## 2025-08-20 DIAGNOSIS — R53.83 FATIGUE, UNSPECIFIED TYPE: Primary | ICD-10-CM

## 2025-08-20 PROCEDURE — 99396 PREV VISIT EST AGE 40-64: CPT | Performed by: OBSTETRICS & GYNECOLOGY

## 2025-08-21 LAB
25(OH)D3+25(OH)D2 SERPL-MCNC: 25 NG/ML (ref 30–100)
BASOPHILS # BLD AUTO: 0.1 THOU/CMM (ref 0–0.1)
BASOPHILS NFR BLD AUTO: 1 %
DIFFERENTIAL METHOD BLD: ABNORMAL
EOSINOPHIL # BLD AUTO: 0.2 THOU/CMM (ref 0–0.5)
EOSINOPHIL NFR BLD AUTO: 4 %
ERYTHROCYTE [DISTWIDTH] IN BLOOD BY AUTOMATED COUNT: 13.3 % (ref 12–16)
HCT VFR BLD AUTO: 42.4 % (ref 35–43)
HGB BLD-MCNC: 14.3 G/DL (ref 11.5–14.5)
LYMPHOCYTES # BLD AUTO: 1.2 THOU/CMM (ref 1–3)
LYMPHOCYTES NFR BLD AUTO: 26 %
MCH RBC QN AUTO: 28.9 PG (ref 26–34)
MCHC RBC AUTO-ENTMCNC: 33.8 G/DL (ref 32–37)
MCV RBC AUTO: 86 FL (ref 80–100)
MONOCYTES # BLD AUTO: 0.4 THOU/CMM (ref 0.3–1)
MONOCYTES NFR BLD AUTO: 9 %
NEUTROPHILS # BLD AUTO: 2.8 THOU/CMM (ref 1.8–7.8)
NEUTROPHILS NFR BLD AUTO: 60 %
PLATELET # BLD AUTO: 230 THOU/CMM (ref 140–350)
PMV BLD REES-ECKER: 9 FL (ref 7.5–11.3)
RBC # BLD AUTO: 4.96 MILL/CMM (ref 3.7–4.7)
TSH SERPL-ACNC: 1.96 UIU/ML (ref 0.45–5.33)
WBC # BLD AUTO: 4.7 THOU/CMM (ref 4–10)

## 2025-08-27 PROBLEM — Z12.11 ENCOUNTER FOR SCREENING COLONOSCOPY: Status: ACTIVE | Noted: 2025-08-27

## (undated) DEVICE — SYRINGE 50ML LL

## (undated) DEVICE — SINGLE PORT MANIFOLD: Brand: NEPTUNE 2

## (undated) DEVICE — TRAY FOLEY 16FR URIMETER SILICONE SURESTEP

## (undated) DEVICE — BETHLEHEM UNIVERSAL GYN LAP PK: Brand: CARDINAL HEALTH

## (undated) DEVICE — INSUFFLATION NEEDLE TO ESTABLISH PNEUMOPERITONEUM.: Brand: INSUFFLATION NEEDLE

## (undated) DEVICE — LAPAROSCOPIC SMOKE EVAC TUBING

## (undated) DEVICE — IV EXTENSION TUBING 33 IN

## (undated) DEVICE — PLASTIC ADHESIVE BANDAGE: Brand: CURITY

## (undated) DEVICE — TROCAR: Brand: KII SLEEVE

## (undated) DEVICE — [HIGH FLOW INSUFFLATOR,  DO NOT USE IF PACKAGE IS DAMAGED,  KEEP DRY,  KEEP AWAY FROM SUNLIGHT,  PROTECT FROM HEAT AND RADIOACTIVE SOURCES.]: Brand: PNEUMOSURE

## (undated) DEVICE — ENDOPATH 5MM ENDOSCOPIC BLUNT TIP DISSECTORS (12 POUCHES CONTAINING 3 DISSECTORS EACH): Brand: ENDOPATH

## (undated) DEVICE — UTERINE MANIPULATOR RUMI 6.7 X 8 CM

## (undated) DEVICE — SUT VICRYL 0 CT-1 36 IN J946H

## (undated) DEVICE — 40595 XL TRENDELENBURG POSITIONING KIT: Brand: 40595 XL TRENDELENBURG POSITIONING KIT

## (undated) DEVICE — GLOVE PI ULTRA TOUCH SZ.7.5

## (undated) DEVICE — SUT PLAIN 3-0 PS-1 27 IN 1640H

## (undated) DEVICE — IRRIG ENDO FLO TUBING

## (undated) DEVICE — SCD SEQUENTIAL COMPRESSION COMFORT SLEEVE MEDIUM KNEE LENGTH: Brand: KENDALL SCD

## (undated) DEVICE — 5 MM CURVED DISSECTORS WITH MONOPOLAR CAUTERY: Brand: ENDOPATH

## (undated) DEVICE — OCCLUDER COLPO-PNEUMO

## (undated) DEVICE — BLUE HEAT SCOPE WARMER

## (undated) DEVICE — PREMIUM DRY TRAY LF: Brand: MEDLINE INDUSTRIES, INC.

## (undated) DEVICE — SUT STRATAFIX SPIRAL 2-0 CT-1 30 CM SXPP1B410

## (undated) DEVICE — CHLORAPREP HI-LITE 26ML ORANGE

## (undated) DEVICE — INTENDED FOR TISSUE SEPARATION, AND OTHER PROCEDURES THAT REQUIRE A SHARP SURGICAL BLADE TO PUNCTURE OR CUT.: Brand: BARD-PARKER SAFETY BLADES SIZE 11, STERILE

## (undated) DEVICE — MAYO STAND COVER: Brand: CONVERTORS

## (undated) DEVICE — PENCIL ELECTROSURG E-Z CLEAN -0035H

## (undated) DEVICE — LIGASURE LAP SLR/DIV MARYLAND 5MM

## (undated) DEVICE — ELECTRODE LAP J HOOK E-Z CLEAN 33CM-0021

## (undated) DEVICE — DRAPE EQUIPMENT RF WAND